# Patient Record
Sex: FEMALE | Race: WHITE | Employment: UNEMPLOYED | RURAL
[De-identification: names, ages, dates, MRNs, and addresses within clinical notes are randomized per-mention and may not be internally consistent; named-entity substitution may affect disease eponyms.]

---

## 2020-09-12 PROBLEM — N39.0 COMPLICATED UTI (URINARY TRACT INFECTION): Status: ACTIVE | Noted: 2020-09-12

## 2020-09-13 PROBLEM — I10 ESSENTIAL HYPERTENSION: Chronic | Status: ACTIVE | Noted: 2020-09-13

## 2020-09-13 PROBLEM — R32 INCONTINENCE OF URINE IN FEMALE: Chronic | Status: ACTIVE | Noted: 2020-09-13

## 2020-09-13 PROBLEM — E03.9 ACQUIRED HYPOTHYROIDISM: Chronic | Status: ACTIVE | Noted: 2020-09-13

## 2020-09-13 PROBLEM — I82.513 CHRONIC DEEP VEIN THROMBOSIS (DVT) OF FEMORAL VEIN OF BOTH LOWER EXTREMITIES (HCC): Chronic | Status: ACTIVE | Noted: 2020-09-13

## 2020-09-13 PROBLEM — N39.0 UTI (URINARY TRACT INFECTION): Status: ACTIVE | Noted: 2020-09-13

## 2020-09-13 PROBLEM — I87.8 VENOUS STASIS OF BOTH LOWER EXTREMITIES: Chronic | Status: ACTIVE | Noted: 2020-09-13

## 2021-09-17 ENCOUNTER — HOSPITAL ENCOUNTER (EMERGENCY)
Age: 86
Discharge: HOME | End: 2021-09-17
Attending: FAMILY MEDICINE
Payer: MEDICARE

## 2021-09-17 DIAGNOSIS — R60.0 PEDAL EDEMA: Primary | ICD-10-CM

## 2021-09-17 LAB
ALBUMIN SERPL-MCNC: 3.8 G/DL (ref 3.5–5)
ALBUMIN/GLOB SERPL: 0.9 {RATIO} (ref 1.1–2.2)
ALP SERPL-CCNC: 129 U/L (ref 45–117)
ALT SERPL-CCNC: 24 U/L (ref 12–78)
ANION GAP SERPL CALC-SCNC: 6 MMOL/L (ref 5–15)
APPEARANCE UR: CLEAR
AST SERPL-CCNC: 16 U/L (ref 15–37)
BACTERIA URNS QL MICRO: NEGATIVE /HPF
BASOPHILS # BLD: 0.1 K/UL (ref 0–0.1)
BASOPHILS NFR BLD: 1 % (ref 0–1)
BILIRUB SERPL-MCNC: 0.4 MG/DL (ref 0.2–1)
BILIRUB UR QL: NEGATIVE
BNP SERPL-MCNC: 248 PG/ML (ref 0–450)
BUN SERPL-MCNC: 23 MG/DL (ref 6–20)
BUN/CREAT SERPL: 16 (ref 12–20)
CALCIUM SERPL-MCNC: 9.5 MG/DL (ref 8.5–10.1)
CHLORIDE SERPL-SCNC: 102 MMOL/L (ref 97–108)
CO2 SERPL-SCNC: 31 MMOL/L (ref 21–32)
COLOR UR: ABNORMAL
CREAT SERPL-MCNC: 1.44 MG/DL (ref 0.55–1.02)
DIFFERENTIAL METHOD BLD: ABNORMAL
EOSINOPHIL # BLD: 0.1 K/UL (ref 0–0.4)
EOSINOPHIL NFR BLD: 1 % (ref 0–7)
EPITH CASTS URNS QL MICRO: NORMAL /LPF
ERYTHROCYTE [DISTWIDTH] IN BLOOD BY AUTOMATED COUNT: 14.2 % (ref 11.5–14.5)
GLOBULIN SER CALC-MCNC: 4.1 G/DL (ref 2–4)
GLUCOSE SERPL-MCNC: 104 MG/DL (ref 65–100)
GLUCOSE UR STRIP.AUTO-MCNC: NEGATIVE MG/DL
HCT VFR BLD AUTO: 42.6 % (ref 35–47)
HGB BLD-MCNC: 14 G/DL (ref 11.5–16)
HGB UR QL STRIP: ABNORMAL
IMM GRANULOCYTES # BLD AUTO: 0 K/UL (ref 0–0.04)
IMM GRANULOCYTES NFR BLD AUTO: 0 % (ref 0–0.5)
KETONES UR QL STRIP.AUTO: NEGATIVE MG/DL
LEUKOCYTE ESTERASE UR QL STRIP.AUTO: NEGATIVE
LYMPHOCYTES # BLD: 1.7 K/UL (ref 0.8–3.5)
LYMPHOCYTES NFR BLD: 24 % (ref 12–49)
MAGNESIUM SERPL-MCNC: 2.1 MG/DL (ref 1.6–2.4)
MCH RBC QN AUTO: 29.2 PG (ref 26–34)
MCHC RBC AUTO-ENTMCNC: 32.9 G/DL (ref 30–36.5)
MCV RBC AUTO: 88.9 FL (ref 80–99)
MONOCYTES # BLD: 0.5 K/UL (ref 0–1)
MONOCYTES NFR BLD: 7 % (ref 5–13)
NEUTS SEG # BLD: 4.7 K/UL (ref 1.8–8)
NEUTS SEG NFR BLD: 67 % (ref 32–75)
NITRITE UR QL STRIP.AUTO: NEGATIVE
NRBC # BLD: 0 K/UL (ref 0–0.01)
NRBC BLD-RTO: 0 PER 100 WBC
PH UR STRIP: 7 [PH] (ref 5–8)
PLATELET # BLD AUTO: 238 K/UL (ref 150–400)
PMV BLD AUTO: 8.2 FL (ref 8.9–12.9)
POTASSIUM SERPL-SCNC: 3.8 MMOL/L (ref 3.5–5.1)
PROT SERPL-MCNC: 7.9 G/DL (ref 6.4–8.2)
PROT UR STRIP-MCNC: NEGATIVE MG/DL
RBC # BLD AUTO: 4.79 M/UL (ref 3.8–5.2)
RBC #/AREA URNS HPF: NORMAL /HPF (ref 0–5)
SODIUM SERPL-SCNC: 139 MMOL/L (ref 136–145)
SP GR UR REFRACTOMETRY: 1.01 (ref 1–1.03)
TROPONIN I SERPL-MCNC: <0.05 NG/ML
TSH SERPL DL<=0.05 MIU/L-ACNC: 1.28 UIU/ML (ref 0.36–3.74)
UROBILINOGEN UR QL STRIP.AUTO: 0.2 EU/DL (ref 0.2–1)
WBC # BLD AUTO: 7.1 K/UL (ref 3.6–11)
WBC URNS QL MICRO: NORMAL /HPF (ref 0–4)

## 2021-09-17 PROCEDURE — 36415 COLL VENOUS BLD VENIPUNCTURE: CPT

## 2021-09-17 PROCEDURE — 93005 ELECTROCARDIOGRAM TRACING: CPT

## 2021-09-17 PROCEDURE — 99284 EMERGENCY DEPT VISIT MOD MDM: CPT

## 2021-09-17 PROCEDURE — 96374 THER/PROPH/DIAG INJ IV PUSH: CPT

## 2021-09-17 PROCEDURE — 81001 URINALYSIS AUTO W/SCOPE: CPT

## 2021-09-17 PROCEDURE — 85025 COMPLETE CBC W/AUTO DIFF WBC: CPT

## 2021-09-17 PROCEDURE — 84443 ASSAY THYROID STIM HORMONE: CPT

## 2021-09-17 PROCEDURE — 83880 ASSAY OF NATRIURETIC PEPTIDE: CPT

## 2021-09-17 PROCEDURE — 83735 ASSAY OF MAGNESIUM: CPT

## 2021-09-17 PROCEDURE — 84484 ASSAY OF TROPONIN QUANT: CPT

## 2021-09-17 PROCEDURE — 80053 COMPREHEN METABOLIC PANEL: CPT

## 2021-09-17 PROCEDURE — 74011250636 HC RX REV CODE- 250/636: Performed by: FAMILY MEDICINE

## 2021-09-17 RX ORDER — SODIUM CHLORIDE 0.9 % (FLUSH) 0.9 %
5-10 SYRINGE (ML) INJECTION ONCE
Status: DISCONTINUED | OUTPATIENT
Start: 2021-09-17 | End: 2021-09-18 | Stop reason: HOSPADM

## 2021-09-17 RX ORDER — FUROSEMIDE 10 MG/ML
20 INJECTION INTRAMUSCULAR; INTRAVENOUS
Status: COMPLETED | OUTPATIENT
Start: 2021-09-17 | End: 2021-09-17

## 2021-09-17 RX ADMIN — FUROSEMIDE 20 MG: 20 INJECTION, SOLUTION INTRAMUSCULAR; INTRAVENOUS at 22:26

## 2021-09-18 VITALS
HEIGHT: 58 IN | TEMPERATURE: 99.7 F | OXYGEN SATURATION: 99 % | HEART RATE: 66 BPM | WEIGHT: 195.55 LBS | SYSTOLIC BLOOD PRESSURE: 187 MMHG | BODY MASS INDEX: 41.05 KG/M2 | RESPIRATION RATE: 14 BRPM | DIASTOLIC BLOOD PRESSURE: 70 MMHG

## 2021-09-18 NOTE — ED PROVIDER NOTES
Patient is a 55-year-old female with a history of arthritis, hypertension and DVT who notes bilateral swelling in her legs after having had a steroid injection into her right knee approximately 2 days ago. .  She has no pain in the legs unless if there is direct pressure applied to them. No chest pain heaviness or pressure. No shortness of breath or cough. No orthopnea or PND. No VELASCO. No hemoptysis. No neck arm or jaw pain. No abdominal pain. No abdominal bloating. She has chronic right knee pain. No history of coronary artery disease or diabetes. Patient may have had symptoms like this in the past and was diuresed and was to wear support stockings but she could not afford them. Nothing makes her symptoms better or worse. Past Medical History:   Diagnosis Date    Arthritis     Ill-defined condition     vertigo    Thromboembolus [facility])        Past Surgical History:   Procedure Laterality Date    HX GYN      hysterectomy         History reviewed. No pertinent family history. Social History     Socioeconomic History    Marital status:      Spouse name: Not on file    Number of children: Not on file    Years of education: Not on file    Highest education level: Not on file   Occupational History    Not on file   Tobacco Use    Smoking status: Never Smoker    Smokeless tobacco: Never Used   Substance and Sexual Activity    Alcohol use: Never    Drug use: Never    Sexual activity: Not on file   Other Topics Concern    Not on file   Social History Narrative    Not on file     Social Determinants of Health     Financial Resource Strain:     Difficulty of Paying Living Expenses:    Food Insecurity:     Worried About Running Out of Food in the Last Year:     [address] in the Last Year:    Transportation Needs:     Lack of Transportation (Medical):      Lack of Transportation (Non-Medical):    Physical Activity:     Days of Exercise per Week:     Minutes of Exercise per Session:    Stress:     Feeling of Stress :    Social Connections:     Frequency of Communication with Friends and Family:     Frequency of Social Gatherings with Friends and Family:     Attends Alevism Services:     Active Member of Clubs or Organizations:     Attends Club or Organization Meetings:     Marital Status:    Intimate Partner Violence:     Fear of Current or Ex-Partner:     Emotionally Abused:     Physically Abused:     Sexually Abused: ALLERGIES: Doxycycline    Review of Systems   All other systems reviewed and are negative. Vitals:    09/17/21 1901   BP: (!) 187/70   Pulse: 66   Resp: 14   Temp: 99.7 °F (37.6 °C)   SpO2: 99%   Weight: 88.7 kg (195 lb 8.8 oz)   Height: 4' 10\" (1.473 m)            Physical Exam  Vitals and nursing note reviewed. Constitutional:       General: She is not in acute distress. Appearance: She is obese. She is not ill-appearing or toxic-appearing. HENT:      Head: Normocephalic and atraumatic. Right Ear: External ear normal.      Left Ear: External ear normal.      Nose: Nose normal.      Mouth/Throat:      Mouth: Mucous membranes are moist.      Pharynx: No oropharyngeal exudate or posterior oropharyngeal erythema. Eyes:      Extraocular Movements: Extraocular movements intact. Conjunctiva/sclera: Conjunctivae normal.      Pupils: Pupils are equal, round, and reactive to light. Cardiovascular:      Rate and Rhythm: Normal rate and regular rhythm. Heart sounds: Normal heart sounds. No murmur heard. No friction rub. No gallop. Comments: No JVD noted  Pulmonary:      Effort: Pulmonary effort is normal. No respiratory distress. Breath sounds: Normal breath sounds. No stridor. No wheezing or rales. Chest:      Chest wall: No tenderness. Abdominal:      General: There is no distension. Palpations: Abdomen is soft. Tenderness: There is no abdominal tenderness. There is no guarding or rebound. Musculoskeletal:         General: Swelling and tenderness present. No deformity or signs of injury. Normal range of motion. Cervical back: Normal range of motion and neck supple. No muscular tenderness. Right lower leg: Edema present. Left lower leg: Edema present. Comments: Bilateral lower extremity edema. Mild erythema anteriorly over the shins which patient states is chronic and unchanged. No cords or calf tenderness is noted. Legs are uncomfortable with direct pressure to check for pitting edema which is 1-2+. 1+ DP pulses bilaterally, sensation intact throughout. No knee effusion is noted. Upper extremities without abnormality. Lymphadenopathy:      Cervical: No cervical adenopathy. Skin:     General: Skin is warm and dry. Capillary Refill: Capillary refill takes less than 2 seconds. Coloration: Skin is not jaundiced or pale. Findings: Erythema present. No rash. Neurological:      General: No focal deficit present. Mental Status: She is alert and oriented to person, place, and time. Cranial Nerves: No cranial nerve deficit. Sensory: No sensory deficit. Motor: No weakness. Coordination: Coordination normal.      Gait: Gait normal.   Psychiatric:         Mood and Affect: Mood normal.         Behavior: Behavior normal.         Thought Content:  Thought content normal.         Judgment: Judgment normal.        Labs -  Recent Results (from the past 24 hour(s))   URINALYSIS W/ RFLX MICROSCOPIC    Collection Time: 09/17/21  7:55 PM   Result Value Ref Range    Color YELLOW/STRAW      Appearance CLEAR CLEAR      Specific gravity 1.007 1.003 - 1.030      pH (UA) 7.0 5.0 - 8.0      Protein Negative NEG mg/dL    Glucose Negative NEG mg/dL    Ketone Negative NEG mg/dL    Bilirubin Negative NEG      Blood TRACE (A) NEG      Urobilinogen 0.2 0.2 - 1.0 EU/dL    Nitrites Negative NEG      Leukocyte Esterase Negative NEG     URINE MICROSCOPIC ONLY    Collection Time: 09/17/21  7:55 PM   Result Value Ref Range    WBC 0-4 0 - 4 /hpf    RBC 5-10 0 - 5 /hpf    Epithelial cells FEW FEW /lpf    Bacteria Negative NEG /hpf   CBC WITH AUTOMATED DIFF    Collection Time: 09/17/21  8:30 PM   Result Value Ref Range    WBC 7.1 3.6 - 11.0 K/uL    RBC 4.79 3.80 - 5.20 M/uL    HGB 14.0 11.5 - 16.0 g/dL    HCT 42.6 35.0 - 47.0 %    MCV 88.9 80.0 - 99.0 FL    MCH 29.2 26.0 - 34.0 PG    MCHC 32.9 30.0 - 36.5 g/dL    RDW 14.2 11.5 - 14.5 %    PLATELET 488 012 - 288 K/uL    MPV 8.2 (L) 8.9 - 12.9 FL    NRBC 0.0 0  WBC    ABSOLUTE NRBC 0.00 0.00 - 0.01 K/uL    NEUTROPHILS 67 32 - 75 %    LYMPHOCYTES 24 12 - 49 %    MONOCYTES 7 5 - 13 %    EOSINOPHILS 1 0 - 7 %    BASOPHILS 1 0 - 1 %    IMMATURE GRANULOCYTES 0 0.0 - 0.5 %    ABS. NEUTROPHILS 4.7 1.8 - 8.0 K/UL    ABS. LYMPHOCYTES 1.7 0.8 - 3.5 K/UL    ABS. MONOCYTES 0.5 0.0 - 1.0 K/UL    ABS. EOSINOPHILS 0.1 0.0 - 0.4 K/UL    ABS. BASOPHILS 0.1 0.0 - 0.1 K/UL    ABS. IMM. GRANS. 0.0 0.00 - 0.04 K/UL    DF AUTOMATED     METABOLIC PANEL, COMPREHENSIVE    Collection Time: 09/17/21  8:30 PM   Result Value Ref Range    Sodium 139 136 - 145 mmol/L    Potassium 3.8 3.5 - 5.1 mmol/L    Chloride 102 97 - 108 mmol/L    CO2 31 21 - 32 mmol/L    Anion gap 6 5 - 15 mmol/L    Glucose 104 (H) 65 - 100 mg/dL    BUN 23 (H) 6 - 20 MG/DL    Creatinine 1.44 (H) 0.55 - 1.02 MG/DL    BUN/Creatinine ratio 16 12 - 20      GFR est AA 42 (L) >60 ml/min/1.73m2    GFR est non-AA 35 (L) >60 ml/min/1.73m2    Calcium 9.5 8.5 - 10.1 MG/DL    Bilirubin, total 0.4 0.2 - 1.0 MG/DL    ALT (SGPT) 24 12 - 78 U/L    AST (SGOT) 16 15 - 37 U/L    Alk.  phosphatase 129 (H) 45 - 117 U/L    Protein, total 7.9 6.4 - 8.2 g/dL    Albumin 3.8 3.5 - 5.0 g/dL    Globulin 4.1 (H) 2.0 - 4.0 g/dL    A-G Ratio 0.9 (L) 1.1 - 2.2     MAGNESIUM    Collection Time: 09/17/21  8:30 PM   Result Value Ref Range    Magnesium 2.1 1.6 - 2.4 mg/dL   TROPONIN I    Collection Time: 09/17/21  8:30 PM Result Value Ref Range    Troponin-I, Qt. <0.05 <0.05 ng/mL   TSH 3RD GENERATION    Collection Time: 09/17/21  8:30 PM   Result Value Ref Range    TSH 1.28 0.36 - 3.74 uIU/mL   NT-PRO BNP    Collection Time: 09/17/21  8:30 PM   Result Value Ref Range    NT pro- 0 - 450 PG/ML   EKG, 12 LEAD, INITIAL    Collection Time: 09/17/21  8:45 PM   Result Value Ref Range    Ventricular Rate 65 BPM    Atrial Rate 65 BPM    P-R Interval 224 ms    QRS Duration 150 ms    Q-T Interval 436 ms    QTC Calculation (Bezet) 453 ms    Calculated P Axis 8 degrees    Calculated R Axis -70 degrees    Calculated T Axis 62 degrees    Diagnosis       Sinus rhythm with 1st degree AV block  Left axis deviation  Left bundle branch block  Abnormal ECG  When compared with ECG of 13-SEP-2020 11:42,  No significant change was found       Radiologic Studies -   CT Results  (Last 48 hours)    None          XR Results (most recent):  Results from Hospital Encounter encounter on 09/12/20    XR CHEST PA LAT    Narrative  EXAM: XR CHEST PA LAT    INDICATION: Fever /  Fleet [place] /  HTN   /  h/o DVT    COMPARISON: 10/15/2018. FINDINGS: PA and lateral radiographs of the chest demonstrate clear lungs and  pleural margins. Cardiac size is again shown to be upper limits of normal. Mild  thoracic aortic tortuosity is again noted with otherwise normal mediastinal and  hilar contour. No substantial osseous abnormality is demonstrated. Impression  IMPRESSION: No acute findings.         MDM  Number of Diagnoses or Management Options  Pedal edema: established and worsening     Amount and/or Complexity of Data Reviewed  Clinical lab tests: ordered and reviewed  Tests in the medicine section of CPT®: reviewed and ordered  Decide to obtain previous medical records or to obtain history from someone other than the patient: yes    Risk of Complications, Morbidity, and/or Mortality  Presenting problems: moderate  Diagnostic procedures: moderate  Management options: moderate  General comments: Patient clinically does not have appear to have a DVT as she has bilateral lower extremity edema which is more or less symmetric. There is no calf tenderness redness or swelling consistent with DVT. Patient is taking Eliquis, which makes it less likely. She has had a recent steroid injections into the right knee which could be causing her to retain fluid and subsequently have pedal edema. No symptoms of congestive heart failure listed. Patient is on no medications that are new which may be causing pedal edema. She has had similar symptoms in the past requiring diuresis and support stockings. Patient Progress  Patient progress: stable    ED Course as of Sep 18 0414   Sat Sep 18, 2021   0410 CBC and CMP are unremarkable except for slight worsening of her chronic renal insufficiency with a creatinine of 1.44. Troponin is negative and EKG is without significant dysrhythmia. BMP is unremarkable magnesium is normal. TSH is normal. Patient was given Lasix here in the ED. I feel that she would benefit from a couple days of increased Lasix to diurese her. I feel that her pedal edema is possibly secondary to recent steroid injection. Patient understands that she needs to follow-up with her primary care doctor within next 2 to 3 days and recheck her renal function studies, she should weigh herself daily and bring daily weights of the visit. Patient understands to return if worse or for any change in symptoms. [PS]   [id number] EKG reveals sinus rhythm with first-degree AV block, ventricular rate of 65, MN interval prolonged at 224, QRS duration is prolonged at 150, QTc is 453, left axis deviation is noted, left bundle branch block is noted. EKG is similar to EKG performed on 12 September 2020 except that first-degree AV block is now more pronounced. Left bundle branch block is more obvious but it was I believe that was present on 12 September 2020 also. It was also present on an EKG prior to that. No evidence of acute ischemia or infarction. [PS]      ED Course User Index  [PS] Tawana Méndez MD       Procedures  Medications   furosemide (LASIX) injection 20 mg (20 mg IntraVENous Given 9/17/21 2226)       No data found. Discharge note:  Pt re-evaluated and noted to be stable, ready for discharge. Updated pt  on all final lab and  X-ray  findings. Will follow up as instructed . All questions have been answered, pt voiced understanding and agreement with plan. Specific return precautions provided as well as instructions to return to the ED should sx worsen at any time. Vital signs stable for discharge. Diagnosis     Clinical Impression:     ICD-10-CM ICD-9-CM    1. Pedal edema  R60.0 782.3          PLAN:  1. Discharge Medication List as of 9/17/2021 11:07 PM        2. Follow-up Information     Follow up With Specialties Details Why Contact Info    Sagrario Montes NP Nurse Practitioner  Follow up todays symptoms. [address]  [phone]          Return to ED if worse     Disposition:  Discharged  Home     Please note, this dictation was completed with [employer], the [employer] voice recognition software. Quite often unanticipated grammatical, syntax, homophones, and other interpretive errors are inadvertently transcribed by the computer software. Please disregard these errors. Please excuse any errors that have escaped final proof reading.

## 2021-09-25 LAB
ATRIAL RATE: 65 BPM
CALCULATED P AXIS, ECG09: 8 DEGREES
CALCULATED R AXIS, ECG10: -70 DEGREES
CALCULATED T AXIS, ECG11: 62 DEGREES
DIAGNOSIS, 93000: NORMAL
P-R INTERVAL, ECG05: 224 MS
Q-T INTERVAL, ECG07: 436 MS
QRS DURATION, ECG06: 150 MS
QTC CALCULATION (BEZET), ECG08: 453 MS
VENTRICULAR RATE, ECG03: 65 BPM

## 2021-10-29 ENCOUNTER — APPOINTMENT (OUTPATIENT)
Dept: GENERAL RADIOLOGY | Age: 86
DRG: 872 | End: 2021-10-29
Attending: EMERGENCY MEDICINE
Payer: MEDICARE

## 2021-10-29 ENCOUNTER — HOSPITAL ENCOUNTER (INPATIENT)
Age: 86
LOS: 1 days | Discharge: HOME | DRG: 872 | End: 2021-11-01
Attending: EMERGENCY MEDICINE | Admitting: INTERNAL MEDICINE
Payer: MEDICARE

## 2021-10-29 DIAGNOSIS — A41.9 SEPSIS WITHOUT ACUTE ORGAN DYSFUNCTION, DUE TO UNSPECIFIED ORGANISM (HCC): Primary | ICD-10-CM

## 2021-10-29 LAB
ALBUMIN SERPL-MCNC: 3.7 G/DL (ref 3.5–5)
ALBUMIN/GLOB SERPL: 1 {RATIO} (ref 1.1–2.2)
ALP SERPL-CCNC: 126 U/L (ref 45–117)
ALT SERPL-CCNC: 25 U/L (ref 12–78)
ANION GAP SERPL CALC-SCNC: 14 MMOL/L (ref 5–15)
APPEARANCE UR: CLEAR
AST SERPL-CCNC: 16 U/L (ref 15–37)
BACTERIA URNS QL MICRO: ABNORMAL /HPF
BASOPHILS # BLD: 0.1 K/UL (ref 0–0.1)
BASOPHILS NFR BLD: 1 % (ref 0–1)
BILIRUB SERPL-MCNC: 0.3 MG/DL (ref 0.2–1)
BILIRUB UR QL: NEGATIVE
BUN SERPL-MCNC: 16 MG/DL (ref 6–20)
BUN/CREAT SERPL: 9 (ref 12–20)
CALCIUM SERPL-MCNC: 9.2 MG/DL (ref 8.5–10.1)
CHLORIDE SERPL-SCNC: 100 MMOL/L (ref 97–108)
CO2 SERPL-SCNC: 25 MMOL/L (ref 21–32)
COLOR UR: ABNORMAL
CREAT SERPL-MCNC: 1.72 MG/DL (ref 0.55–1.02)
DIFFERENTIAL METHOD BLD: ABNORMAL
EOSINOPHIL # BLD: 0 K/UL (ref 0–0.4)
EOSINOPHIL NFR BLD: 0 % (ref 0–7)
EPITH CASTS URNS QL MICRO: ABNORMAL /LPF
ERYTHROCYTE [DISTWIDTH] IN BLOOD BY AUTOMATED COUNT: 14.5 % (ref 11.5–14.5)
GLOBULIN SER CALC-MCNC: 3.8 G/DL (ref 2–4)
GLUCOSE SERPL-MCNC: 148 MG/DL (ref 65–100)
GLUCOSE UR STRIP.AUTO-MCNC: NEGATIVE MG/DL
HCT VFR BLD AUTO: 42.5 % (ref 35–47)
HGB BLD-MCNC: 13.9 G/DL (ref 11.5–16)
HGB UR QL STRIP: ABNORMAL
IMM GRANULOCYTES # BLD AUTO: 0 K/UL (ref 0–0.04)
IMM GRANULOCYTES NFR BLD AUTO: 0 % (ref 0–0.5)
KETONES UR QL STRIP.AUTO: NEGATIVE MG/DL
LACTATE SERPL-SCNC: 1.5 MMOL/L (ref 0.4–2)
LACTATE SERPL-SCNC: 2.9 MMOL/L (ref 0.4–2)
LEUKOCYTE ESTERASE UR QL STRIP.AUTO: ABNORMAL
LYMPHOCYTES # BLD: 1 K/UL (ref 0.8–3.5)
LYMPHOCYTES NFR BLD: 10 % (ref 12–49)
MCH RBC QN AUTO: 29.3 PG (ref 26–34)
MCHC RBC AUTO-ENTMCNC: 32.7 G/DL (ref 30–36.5)
MCV RBC AUTO: 89.7 FL (ref 80–99)
MONOCYTES # BLD: 0.6 K/UL (ref 0–1)
MONOCYTES NFR BLD: 6 % (ref 5–13)
MUCOUS THREADS URNS QL MICRO: ABNORMAL /LPF
NEUTS SEG # BLD: 9.1 K/UL (ref 1.8–8)
NEUTS SEG NFR BLD: 83 % (ref 32–75)
NITRITE UR QL STRIP.AUTO: NEGATIVE
NRBC # BLD: 0 K/UL (ref 0–0.01)
NRBC BLD-RTO: 0 PER 100 WBC
PH UR STRIP: 7 [PH] (ref 5–8)
PLATELET # BLD AUTO: 242 K/UL (ref 150–400)
PMV BLD AUTO: 8.4 FL (ref 8.9–12.9)
POTASSIUM SERPL-SCNC: 4.5 MMOL/L (ref 3.5–5.1)
PROT SERPL-MCNC: 7.5 G/DL (ref 6.4–8.2)
PROT UR STRIP-MCNC: ABNORMAL MG/DL
RBC # BLD AUTO: 4.74 M/UL (ref 3.8–5.2)
RBC #/AREA URNS HPF: ABNORMAL /HPF (ref 0–5)
SODIUM SERPL-SCNC: 139 MMOL/L (ref 136–145)
SP GR UR REFRACTOMETRY: 1.01 (ref 1–1.03)
TROPONIN-HIGH SENSITIVITY: 9 NG/L (ref 0–51)
UA: UC IF INDICATED,UAUC: ABNORMAL
UROBILINOGEN UR QL STRIP.AUTO: 0.2 EU/DL (ref 0.2–1)
WBC # BLD AUTO: 11 K/UL (ref 3.6–11)
WBC URNS QL MICRO: >100 /HPF (ref 0–4)

## 2021-10-29 PROCEDURE — 74011250636 HC RX REV CODE- 250/636: Performed by: EMERGENCY MEDICINE

## 2021-10-29 PROCEDURE — 36415 COLL VENOUS BLD VENIPUNCTURE: CPT

## 2021-10-29 PROCEDURE — 74011250637 HC RX REV CODE- 250/637: Performed by: FAMILY MEDICINE

## 2021-10-29 PROCEDURE — 99285 EMERGENCY DEPT VISIT HI MDM: CPT

## 2021-10-29 PROCEDURE — 84484 ASSAY OF TROPONIN QUANT: CPT

## 2021-10-29 PROCEDURE — 80053 COMPREHEN METABOLIC PANEL: CPT

## 2021-10-29 PROCEDURE — 87086 URINE CULTURE/COLONY COUNT: CPT

## 2021-10-29 PROCEDURE — 81001 URINALYSIS AUTO W/SCOPE: CPT

## 2021-10-29 PROCEDURE — 93005 ELECTROCARDIOGRAM TRACING: CPT

## 2021-10-29 PROCEDURE — 71045 X-RAY EXAM CHEST 1 VIEW: CPT

## 2021-10-29 PROCEDURE — 74011250636 HC RX REV CODE- 250/636: Performed by: FAMILY MEDICINE

## 2021-10-29 PROCEDURE — 74011000258 HC RX REV CODE- 258: Performed by: FAMILY MEDICINE

## 2021-10-29 PROCEDURE — 99218 HC RM OBSERVATION: CPT

## 2021-10-29 PROCEDURE — 87040 BLOOD CULTURE FOR BACTERIA: CPT

## 2021-10-29 PROCEDURE — 96365 THER/PROPH/DIAG IV INF INIT: CPT

## 2021-10-29 PROCEDURE — 85025 COMPLETE CBC W/AUTO DIFF WBC: CPT

## 2021-10-29 PROCEDURE — 83605 ASSAY OF LACTIC ACID: CPT

## 2021-10-29 RX ORDER — GLUCOSAMINE/CHONDR SU A SOD 750-600 MG
2500 TABLET ORAL DAILY
Status: ON HOLD | COMMUNITY
End: 2021-11-01 | Stop reason: SDUPTHER

## 2021-10-29 RX ORDER — SODIUM CHLORIDE 9 MG/ML
100 INJECTION, SOLUTION INTRAVENOUS ONCE
Status: COMPLETED | OUTPATIENT
Start: 2021-10-29 | End: 2021-10-29

## 2021-10-29 RX ORDER — SODIUM CHLORIDE 0.9 % (FLUSH) 0.9 %
5-10 SYRINGE (ML) INJECTION AS NEEDED
Status: DISCONTINUED | OUTPATIENT
Start: 2021-10-29 | End: 2021-11-01 | Stop reason: HOSPADM

## 2021-10-29 RX ORDER — DILTIAZEM HYDROCHLORIDE 240 MG/1
240 CAPSULE, COATED, EXTENDED RELEASE ORAL
Status: COMPLETED | OUTPATIENT
Start: 2021-10-29 | End: 2021-10-29

## 2021-10-29 RX ORDER — ACETAMINOPHEN 500 MG
1000 TABLET ORAL
Status: DISCONTINUED | OUTPATIENT
Start: 2021-10-29 | End: 2021-10-29

## 2021-10-29 RX ORDER — ACETAMINOPHEN 325 MG/1
650 TABLET ORAL
Status: DISCONTINUED | OUTPATIENT
Start: 2021-10-29 | End: 2021-11-01 | Stop reason: HOSPADM

## 2021-10-29 RX ORDER — ONDANSETRON 2 MG/ML
4 INJECTION INTRAMUSCULAR; INTRAVENOUS
Status: DISCONTINUED | OUTPATIENT
Start: 2021-10-29 | End: 2021-11-01 | Stop reason: HOSPADM

## 2021-10-29 RX ADMIN — CEFTRIAXONE SODIUM 2 G: 2 INJECTION, POWDER, FOR SOLUTION INTRAMUSCULAR; INTRAVENOUS at 20:13

## 2021-10-29 RX ADMIN — APIXABAN 5 MG: 2.5 TABLET, FILM COATED ORAL at 20:54

## 2021-10-29 RX ADMIN — SODIUM CHLORIDE 100 ML/HR: 9 INJECTION, SOLUTION INTRAVENOUS at 21:00

## 2021-10-29 RX ADMIN — SODIUM CHLORIDE 500 ML: 9 INJECTION, SOLUTION INTRAVENOUS at 20:31

## 2021-10-29 RX ADMIN — DILTIAZEM HYDROCHLORIDE 240 MG: 240 CAPSULE, COATED, EXTENDED RELEASE ORAL at 20:54

## 2021-10-29 RX ADMIN — ACETAMINOPHEN 650 MG: 325 TABLET ORAL at 22:48

## 2021-10-29 RX ADMIN — SODIUM CHLORIDE 500 ML: 9 INJECTION, SOLUTION INTRAVENOUS at 19:06

## 2021-10-29 NOTE — ED NOTES
Urine obtained with assistance. Pt moved back to bed. Placed on purewick as requested with clean brief. Pt had much difficulty moving up in bed.

## 2021-10-29 NOTE — ED PROVIDER NOTES
EMERGENCY DEPARTMENT HISTORY AND PHYSICAL EXAM      Date: 10/29/2021  Patient Name: Jorge King    History of Presenting Illness     Chief Complaint   Patient presents with    Bladder Infection       History Provided By: Patient    HPI: Jorge King, 81 y.o. female with PMHx significant for hypertension, hypothyroid, presents via EMS to the ED with cc of UTI symptoms. Patient reports she was recently diagnosed by her urologist with a UTI. She saw them and was started yesterday on Bactrim. She has had 3 doses so far. She presents with generalized weakness, nausea and urinary frequency and burning. She denies any abdominal pain. She denies any vomiting. No diarrhea. Denies any chest pain or shortness of breath. PMHx: Significant for tension, hypothyroid  PSHx: Significant for hysterectomy  Social Hx: Non-smoker. There are no other complaints, changes, or physical findings at this time. PCP: Jose Luis Griggs NP    No current facility-administered medications on file prior to encounter. Current Outpatient Medications on File Prior to Encounter   Medication Sig Dispense Refill    Biotin 2,500 mcg cap Take 2,500 mg by mouth daily.  mirabegron ER (Myrbetriq) 25 mg ER tablet Take 25 mg by mouth daily.  apixaban (Eliquis) 5 mg tablet Take 5 mg by mouth two (2) times a day. Indications: blood clot in a deep vein of the extremities      vit a,c & e-lutein-minerals (OCUVITE) tablet 1 Tab daily.  dilTIAZem ER (CARDIZEM LA) 240 mg Tb24 tablet Take 240 mg by mouth daily.  furosemide (LASIX) 20 mg tablet Take 20 mg by mouth daily.  hydroCHLOROthiazide (HYDRODIURIL) 12.5 mg tablet Take 12.5 mg by mouth daily.  levothyroxine (SYNTHROID) 100 mcg tablet Take 100 mcg by mouth Daily (before breakfast).  meclizine (ANTIVERT) 12.5 mg tablet Take 12.5 mg by mouth three (3) times daily as needed.          Past History     Past Medical History:  Past Medical History: Diagnosis Date    Arthritis     Ill-defined condition     vertigo    Thromboembolus [facility])        Past Surgical History:  Past Surgical History:   Procedure Laterality Date    HX GYN      hysterectomy       Family History:  History reviewed. No pertinent family history. Social History:  Social History     Tobacco Use    Smoking status: Never Smoker    Smokeless tobacco: Never Used   Substance Use Topics    Alcohol use: Never    Drug use: Never       Allergies: Allergies   Allergen Reactions    Doxycycline Nausea and Vomiting         Review of Systems   Review of Systems   Constitutional: Positive for fatigue. Negative for activity change, chills and fever. HENT: Negative for congestion and sore throat. Eyes: Negative for pain and redness. Respiratory: Negative for cough, chest tightness and shortness of breath. Cardiovascular: Negative for chest pain and palpitations. Gastrointestinal: Positive for nausea. Negative for abdominal pain, diarrhea and vomiting. Genitourinary: Negative for dysuria, frequency and urgency. Musculoskeletal: Negative for back pain and neck pain. Skin: Negative for rash. Neurological: Negative for syncope, light-headedness and headaches. Psychiatric/Behavioral: Negative for confusion. All other systems reviewed and are negative. Physical Exam   Physical Exam  Vitals and nursing note reviewed. Constitutional:       General: She is not in acute distress. Appearance: She is well-developed. She is obese. She is not diaphoretic. Comments: Calm and pleasant elderly white female. HENT:      Head: Normocephalic. Nose: Nose normal.      Mouth/Throat:      Pharynx: No oropharyngeal exudate. Eyes:      General: No scleral icterus. Conjunctiva/sclera: Conjunctivae normal.      Pupils: Pupils are equal, round, and reactive to light. Neck:      Thyroid: No thyromegaly. Vascular: No JVD. Trachea: No tracheal deviation. Cardiovascular:      Rate and Rhythm: Normal rate and regular rhythm. Heart sounds: No murmur heard. No friction rub. No gallop. Pulmonary:      Effort: Pulmonary effort is normal. No respiratory distress. Breath sounds: Normal breath sounds. No stridor. No wheezing or rales. Abdominal:      General: Bowel sounds are normal. There is no distension. Palpations: Abdomen is soft. Tenderness: There is no abdominal tenderness. There is no guarding or rebound. Musculoskeletal:         General: Normal range of motion. Cervical back: Normal range of motion and neck supple. Right lower leg: Edema present. Left lower leg: Edema present. Comments: Plus edema up to the knee bilaterally with chronic stasis changes. Lymphadenopathy:      Cervical: No cervical adenopathy. Skin:     General: Skin is warm and dry. Findings: No erythema or rash. Neurological:      Mental Status: She is alert and oriented to person, place, and time. Cranial Nerves: No cranial nerve deficit. Motor: No abnormal muscle tone.       Coordination: Coordination normal.   Psychiatric:         Behavior: Behavior normal.             Diagnostic Study Results     Labs -     Recent Results (from the past 12 hour(s))   EKG, 12 LEAD, INITIAL    Collection Time: 10/29/21  7:28 PM   Result Value Ref Range    Ventricular Rate 80 BPM    Atrial Rate 80 BPM    P-R Interval 198 ms    QRS Duration 140 ms    Q-T Interval 410 ms    QTC Calculation (Bezet) 472 ms    Calculated P Axis 79 degrees    Calculated R Axis -74 degrees    Calculated T Axis 57 degrees    Diagnosis       Normal sinus rhythm  Left axis deviation  Nonspecific intraventricular block  Abnormal ECG  When compared with ECG of 17-SEP-2021 20:45,  No significant change was found     LACTIC ACID    Collection Time: 10/29/21  9:10 PM   Result Value Ref Range    Lactic acid 1.5 0.4 - 2.0 MMOL/L       Radiologic Studies -   XR CHEST PORT   Final Result   No acute infiltrate. CT Results  (Last 48 hours)    None        CXR Results  (Last 48 hours)               10/29/21 1859  XR CHEST PORT Final result    Impression:  No acute infiltrate. Narrative:  Clinical indication: Shortness of breath. Frontal view of the chest obtained AP portable. Comparison September 13, 2020. No acute infiltrate. The heart size is normal.                       Medical Decision Making   I am the first provider for this patient. I reviewed the vital signs, available nursing notes, past medical history, past surgical history, family history and social history. Vital Signs-Reviewed the patient's vital signs. Patient Vitals for the past 12 hrs:   Temp Pulse Resp BP SpO2   10/30/21 0036 98.5 °F (36.9 °C)       10/29/21 2234 (!) 102 °F (38.9 °C) 75 20 120/65 96 %   10/29/21 2145  82 18 127/86 100 %   10/29/21 2121  72 18 (!) 134/94 96 %   10/29/21 2054  74 18 126/82 96 %   10/29/21 2028 99.2 °F (37.3 °C)       10/29/21 2000  77 18 135/82 97 %           Records Reviewed: Nursing notes reviewed    Provider Notes (Medical Decision Making):   DDX: UTI, sepsis, metabolic abnormality. ED Course:   Initial assessment performed. The patients presenting problems have been discussed, and they are in agreement with the care plan formulated and outlined with them. I have encouraged them to ask questions as they arise throughout their visit. ED Course as of Oct 30 0715   Fri Oct 29, 2021   1913 Labs pending. Care signed out to Dr. Nuria Minaya. Patient well-appearing. Vital signs currently stable. [CH]   2117 Patient be admitted to hospital for IV fluids and IV antibiotics. She is weak and cannot help her self at home. Has a high risk for falling. She has failed outpatient treatment with oral antibiotics.  EKG demonstrates normal sinus rhythm with a rate of 80, MT intervals 198 normal, QRS prolonged at 140, QTc is 472, nonspecific interventricular block is noted. Care discussed with Dr. Chasity Hernandez, patient will be admitted to hospitalist service. [PS]   2200 Repeat lactic acid down to 1.5. Patient continues to be hemodynamically stable while here in the ED. [PS]      ED Course User Index  [CH] Natalia Chen MD  [PS] Vasiliy Lo MD                     Critical Care Time:   0    Disposition:  Admit    PLAN:  1. Current Discharge Medication List        2. Follow-up Information    None       Return to ED if worse     Diagnosis     Clinical Impression:   1. Sepsis without acute organ dysfunction, due to unspecified organism [facility])              Please note that this dictation was completed with [employer], the computer voice recognition software. Quite often unanticipated grammatical, syntax, homophones, and other interpretive errors are inadvertently transcribed by the computer software. Please disregard these errors. Please excuse any errors that have escaped final proofreading.

## 2021-10-29 NOTE — ED TRIAGE NOTES
Pt diagnosed with UTI 10/28/21. Pt has been taking bactrim for her infection. Pt feels weaker, continued fever. Pt brought by EMS, moved from stretcher, unable to ambulate.

## 2021-10-29 NOTE — Clinical Note
Patient Class[de-identified] OBSERVATION [730]   Type of Bed: Medical [8]   Cardiac Monitoring Required?: No   Reason for Observation: if meds   Admitting Diagnosis: Sepsis [facility]) [[id number]]   Admitting Physician: Liz Vega [[id number]]   Attending Physician: Liz Vega [[id number]]

## 2021-10-30 PROCEDURE — 74011250637 HC RX REV CODE- 250/637: Performed by: INTERNAL MEDICINE

## 2021-10-30 PROCEDURE — 96376 TX/PRO/DX INJ SAME DRUG ADON: CPT

## 2021-10-30 PROCEDURE — 99218 HC RM OBSERVATION: CPT

## 2021-10-30 PROCEDURE — 74011250637 HC RX REV CODE- 250/637: Performed by: FAMILY MEDICINE

## 2021-10-30 PROCEDURE — 74011000258 HC RX REV CODE- 258: Performed by: INTERNAL MEDICINE

## 2021-10-30 PROCEDURE — 74011250636 HC RX REV CODE- 250/636: Performed by: INTERNAL MEDICINE

## 2021-10-30 RX ORDER — DILTIAZEM HYDROCHLORIDE 240 MG/1
240 CAPSULE, COATED, EXTENDED RELEASE ORAL DAILY
Status: DISCONTINUED | OUTPATIENT
Start: 2021-10-30 | End: 2021-11-01 | Stop reason: HOSPADM

## 2021-10-30 RX ORDER — TROSPIUM CHLORIDE 20 MG/1
20 TABLET, FILM COATED ORAL DAILY
Status: DISCONTINUED | OUTPATIENT
Start: 2021-10-30 | End: 2021-11-01 | Stop reason: HOSPADM

## 2021-10-30 RX ORDER — MECLIZINE HCL 12.5 MG 12.5 MG/1
12.5 TABLET ORAL
Status: DISCONTINUED | OUTPATIENT
Start: 2021-10-30 | End: 2021-11-01 | Stop reason: HOSPADM

## 2021-10-30 RX ORDER — LEVOTHYROXINE SODIUM 100 UG/1
100 TABLET ORAL
Status: DISCONTINUED | OUTPATIENT
Start: 2021-10-30 | End: 2021-11-01 | Stop reason: HOSPADM

## 2021-10-30 RX ADMIN — TROSPIUM CHLORIDE 20 MG: 20 TABLET, FILM COATED ORAL at 10:37

## 2021-10-30 RX ADMIN — LEVOTHYROXINE SODIUM 100 MCG: 0.1 TABLET ORAL at 10:38

## 2021-10-30 RX ADMIN — CEFTRIAXONE SODIUM 1 G: 1 INJECTION, POWDER, FOR SOLUTION INTRAMUSCULAR; INTRAVENOUS at 20:37

## 2021-10-30 RX ADMIN — APIXABAN 5 MG: 5 TABLET, FILM COATED ORAL at 10:38

## 2021-10-30 RX ADMIN — APIXABAN 5 MG: 5 TABLET, FILM COATED ORAL at 17:20

## 2021-10-30 RX ADMIN — ACETAMINOPHEN 650 MG: 325 TABLET ORAL at 17:23

## 2021-10-30 RX ADMIN — DILTIAZEM HYDROCHLORIDE 240 MG: 240 CAPSULE, COATED, EXTENDED RELEASE ORAL at 10:37

## 2021-10-30 NOTE — PROGRESS NOTES
Problem: Falls - Risk of  Goal: *Absence of Falls  Description: Document Robb Lema Fall Risk and appropriate interventions in the flowsheet.   Outcome: Progressing Towards Goal  Note: Fall Risk Interventions:  Mobility Interventions: PT Consult for mobility concerns         Medication Interventions: Patient to call before getting OOB    Elimination Interventions: Call light in reach    History of Falls Interventions: Bed/chair exit alarm, Door open when patient unattended         Problem: Patient Education: Go to Patient Education Activity  Goal: Patient/Family Education  Outcome: Progressing Towards Goal     Problem: Urinary Tract Infection - Adult  Goal: *Absence of infection signs and symptoms  Outcome: Progressing Towards Goal     Problem: General Medical Care Plan  Goal: *Labs within defined limits  Outcome: Progressing Towards Goal  Goal: *Absence of infection signs and symptoms  Outcome: Progressing Towards Goal  Goal: *Skin integrity maintained  Outcome: Progressing Towards Goal  Goal: *Fluid volume balance  Outcome: Progressing Towards Goal

## 2021-10-30 NOTE — PROGRESS NOTES
TRANSFER - IN REPORT:    Verbal report received from TROY Lutz RN(name) on Varun Davila  being received from ED(unit) for routine progression of care      Report consisted of patients Situation, Background, Assessment and   Recommendations(SBAR). Information from the following report(s) Kardex was reviewed with the receiving nurse. Opportunity for questions and clarification was provided. Assessment completed upon patients arrival to unit and care assumed.

## 2021-10-30 NOTE — PROGRESS NOTES
Bedside shift change report given to [address] (oncoming nurse) by Ailyn Reyes RN (offgoing nurse). Report included the following information Kardex and Recent Results.

## 2021-10-30 NOTE — PROGRESS NOTES
Bedside shift change report given to Karl Carpenter RN (oncoming nurse) by LAUREN Romero LPN (offgoing nurse). Report included the following information SBAR, Kardex and MAR.

## 2021-10-30 NOTE — H&P
History and Physical           History and Physical    Patient: Bharti Duncan MRN: [id number]  SSN: [id number]    YOB: 1935  Age: 81 y.o. Sex: female      Subjective:      Bharti Duncan is a 81 y.o. female with PMHx significant for hypertension, hypothyroid, blood clots, recurrent UTIs presents via EMS to the ED with cc of UTI symptoms and weakness. Patient reports she was recently diagnosed by her urologist with a UTI and appears to have been started on Bactrim which she reports made her feel terrible. She has had 3 doses so far. She presents with generalized weakness, nausea and urinary frequency and burning. She denies any abdominal pain. She denies any vomiting. No diarrhea. Denies any chest pain or shortness of breath. NOTE: She has redness, warmth, and tenderness over both lower legs but she says they have been like that \" for ages\" and to ignore it.     PMHx: Significant for tension, hypothyroid  PSHx: Significant for hysterectomy  Social Hx: Non-smoker. In ED:   Tmax 102     83    135/52     18/95%  WBC: 11.0    CMP: Cr 1.72  U/A: Large: Leuks   WBC > 100  Lactic Acid: 2.9 -> 1.5    Received:  - IVF  - Ceftriaxone 2mg    Feels much better after IVF and Ceftriaxone. Past Medical History:   Diagnosis Date    Arthritis     Ill-defined condition     vertigo    Thromboembolus [facility])      Past Surgical History:   Procedure Laterality Date    HX GYN      hysterectomy      History reviewed. No pertinent family history. Social History     Tobacco Use    Smoking status: Never Smoker    Smokeless tobacco: Never Used   Substance Use Topics    Alcohol use: Never      Prior to Admission medications    Medication Sig Start Date End Date Taking? Authorizing Provider   Biotin 2,500 mcg cap Take 2,500 mg by mouth daily. Other, MD Parker   mirabegron ER (Myrbetriq) 25 mg ER tablet Take 25 mg by mouth daily.     Provider, Yusra   apixaban (Eliquis) 5 mg tablet Take 5 mg by mouth two (2) times a day. Indications: blood clot in a deep vein of the extremities    Provider, Historical   vit a,c & e-lutein-minerals (OCUVITE) tablet 1 Tab daily. Parker Arroyo MD   dilTIAZem ER (CARDIZEM LA) 240 mg Tb24 tablet Take 240 mg by mouth daily. Parker Arroyo MD   furosemide (LASIX) 20 mg tablet Take 20 mg by mouth daily. Parker Arroyo MD   hydroCHLOROthiazide (HYDRODIURIL) 12.5 mg tablet Take 12.5 mg by mouth daily. Parker Arroyo MD   levothyroxine (SYNTHROID) 100 mcg tablet Take 100 mcg by mouth Daily (before breakfast). Parker Arroyo MD   meclizine (ANTIVERT) 12.5 mg tablet Take 12.5 mg by mouth three (3) times daily as needed. Parker Arroyo MD        Allergies   Allergen Reactions    Doxycycline Nausea and Vomiting       Review of Systems:  See HPI  Feels much better after therapy. Reports chills/tremor, which is new over the last few days. Reports much less weakness. Denies ongoing nausea or dysuria. Objective:     Vitals:    10/29/21 2145 10/29/21 2234 10/30/21 0036 10/30/21 0755   BP: 127/86 120/65  137/62   Pulse: 82 75  76   Resp: 18 20  20   Temp:  (!) 102 °F (38.9 °C) 98.5 °F (36.9 °C) 99.1 °F (37.3 °C)   SpO2: 100% 96%  96%   Weight:  94.6 kg (208 lb 9.6 oz)     Height:  4' 11\" (1.499 m)          Physical Exam:  Constitutional:       General: She is not in acute distress. Appearance: She is well-developed. She is obese. She is not diaphoretic. HENT:      Head: Normocephalic. Nose: Nose normal.   Eyes:      General: No scleral icterus. Conjunctiva/sclera: Conjunctivae normal.      Pupils: Pupils are equal, round, and reactive to light. Neck:      Thyroid: No thyromegaly. Vascular: No JVD. Trachea: No tracheal deviation. Cardiovascular:      Rate and Rhythm: Normal rate and regular rhythm. Heart sounds: No murmur heard. No friction rub. No gallop. Pulmonary:      Effort: Pulmonary effort is normal. No respiratory distress.       Breath sounds: Normal breath sounds. No stridor. No wheezing or rales. Abdominal:      General: Bowel sounds are normal. There is no distension. Palpations: Abdomen is soft. Tenderness: There is no abdominal tenderness. There is no guarding or rebound. Musculoskeletal:         General: Normal range of motion. Cervical back: Normal range of motion and neck supple. Right lower leg: Edema present. Left lower leg: Edema present. Comments: Plus edema up to the knee bilaterally with chronic stasis changes. Lymphadenopathy:      Cervical: No cervical adenopathy. Skin:     General: Skin is warm and dry. Findings: B/L erythema, warmth, and tederness over lower legs (she reports that it is longstanding)  Neurological:      Mental Status: She is alert and oriented to person, place, and time. Cranial Nerves: No cranial nerve deficit. Motor: No abnormal muscle tone. Coordination: Coordination normal.   Psychiatric:         Behavior: Behavior normal.        Assessment:     Hospital Problems  Date Reviewed: 9/13/2020        Codes Class Noted POA    [facility] ICD-10-CM: A41.9  ICD-9-CM: 038.9, 995.91  10/29/2021 Unknown          81 y.o. female with PMHx significant for hypertension, hypothyroid, recurrent UTIs presents via EMS to the ED with cc of UTI symptoms and weakness. Now much improved with therapy.     Plan:   1.) Sepsis 2/2 UTI  - continue Ceftriaxone  - IVF as needed  - Lactic Acid 2.9 -> 1.5  [ ] f/u Am labs  [ ] consider discussion with Urologist on Monday    2.) Chronic issues  - HTN: Diltiazem  - Hypothyroid: Synthroid  - Over Active bladder: trospium  - Hx Blood clots: Apixaban    DVT PPx: Apixaban  Diet: Regular  Dispo: Likely home Monday after 3 doses of Ceftriaxone +/-     Signed By: Ed Hunter MD     October 30, 2021

## 2021-10-30 NOTE — PROGRESS NOTES
Problem: Falls - Risk of  Goal: *Absence of Falls  Description: Document Walter Gant Fall Risk and appropriate interventions in the flowsheet.   Outcome: Progressing Towards Goal  Note: Fall Risk Interventions:  Mobility Interventions: Bed/chair exit alarm         Medication Interventions: Bed/chair exit alarm    Elimination Interventions: Call light in reach    History of Falls Interventions: Bed/chair exit alarm         Problem: General Medical Care Plan  Goal: *Vital signs within specified parameters  Outcome: Progressing Towards Goal  Goal: *Labs within defined limits  Outcome: Progressing Towards Goal  Goal: *Absence of infection signs and symptoms  Outcome: Progressing Towards Goal  Goal: *Optimal pain control at patient's stated goal  Outcome: Progressing Towards Goal  Goal: *Skin integrity maintained  Outcome: Progressing Towards Goal  Goal: *Fluid volume balance  Outcome: Progressing Towards Goal

## 2021-10-30 NOTE — ED NOTES
TRANSFER - OUT REPORT:    Verbal report given to Prakash Grant RN(name) on Chinmay Peres  being transferred to [facility] Bed 128(unit) for routine progression of care       Report consisted of patients Situation, Background, Assessment and   Recommendations(SBAR). Information from the following report(s) SBAR, ED Summary, Intake/Output, MAR, Accordion, Recent Results, Med Rec Status and Quality Measures was reviewed with the receiving nurse. Lines:   Peripheral IV 10/29/21 Left Wrist (Active)   Site Assessment Clean, dry, & intact 10/29/21 1847   Phlebitis Assessment 0 10/29/21 1847   Infiltration Assessment 0 10/29/21 1847   Dressing Status Clean, dry, & intact 10/29/21 1847   Dressing Type Transparent 10/29/21 1847   Hub Color/Line Status Green 10/29/21 1847   Alcohol Cap Used No 10/29/21 1847        Opportunity for questions and clarification was provided.       Patient transported with:  RN Supervisor to Room

## 2021-10-31 LAB
ALBUMIN SERPL-MCNC: 2.8 G/DL (ref 3.5–5)
ALBUMIN/GLOB SERPL: 0.7 {RATIO} (ref 1.1–2.2)
ALP SERPL-CCNC: 89 U/L (ref 45–117)
ALT SERPL-CCNC: 19 U/L (ref 12–78)
ANION GAP SERPL CALC-SCNC: 6 MMOL/L (ref 5–15)
AST SERPL-CCNC: 22 U/L (ref 15–37)
BACTERIA SPEC CULT: NORMAL
BILIRUB SERPL-MCNC: 0.5 MG/DL (ref 0.2–1)
BUN SERPL-MCNC: 14 MG/DL (ref 6–20)
BUN/CREAT SERPL: 10 (ref 12–20)
CALCIUM SERPL-MCNC: 8.8 MG/DL (ref 8.5–10.1)
CC UR VC: NORMAL
CHLORIDE SERPL-SCNC: 106 MMOL/L (ref 97–108)
CO2 SERPL-SCNC: 26 MMOL/L (ref 21–32)
CREAT SERPL-MCNC: 1.36 MG/DL (ref 0.55–1.02)
ERYTHROCYTE [DISTWIDTH] IN BLOOD BY AUTOMATED COUNT: 14.6 % (ref 11.5–14.5)
GLOBULIN SER CALC-MCNC: 3.8 G/DL (ref 2–4)
GLUCOSE SERPL-MCNC: 96 MG/DL (ref 65–100)
HCT VFR BLD AUTO: 39.9 % (ref 35–47)
HGB BLD-MCNC: 12.8 G/DL (ref 11.5–16)
MAGNESIUM SERPL-MCNC: 2.4 MG/DL (ref 1.6–2.4)
MCH RBC QN AUTO: 29.2 PG (ref 26–34)
MCHC RBC AUTO-ENTMCNC: 32.1 G/DL (ref 30–36.5)
MCV RBC AUTO: 90.9 FL (ref 80–99)
NRBC # BLD: 0 K/UL (ref 0–0.01)
NRBC BLD-RTO: 0 PER 100 WBC
PLATELET # BLD AUTO: 146 K/UL (ref 150–400)
PMV BLD AUTO: 10.9 FL (ref 8.9–12.9)
POTASSIUM SERPL-SCNC: 4.2 MMOL/L (ref 3.5–5.1)
PROT SERPL-MCNC: 6.6 G/DL (ref 6.4–8.2)
RBC # BLD AUTO: 4.39 M/UL (ref 3.8–5.2)
SERVICE CMNT-IMP: NORMAL
SODIUM SERPL-SCNC: 138 MMOL/L (ref 136–145)
WBC # BLD AUTO: 6.3 K/UL (ref 3.6–11)

## 2021-10-31 PROCEDURE — 99218 HC RM OBSERVATION: CPT

## 2021-10-31 PROCEDURE — 85027 COMPLETE CBC AUTOMATED: CPT

## 2021-10-31 PROCEDURE — 80053 COMPREHEN METABOLIC PANEL: CPT

## 2021-10-31 PROCEDURE — 36415 COLL VENOUS BLD VENIPUNCTURE: CPT

## 2021-10-31 PROCEDURE — 74011250637 HC RX REV CODE- 250/637: Performed by: INTERNAL MEDICINE

## 2021-10-31 PROCEDURE — 83735 ASSAY OF MAGNESIUM: CPT

## 2021-10-31 PROCEDURE — 74011000258 HC RX REV CODE- 258: Performed by: INTERNAL MEDICINE

## 2021-10-31 PROCEDURE — 74011250637 HC RX REV CODE- 250/637: Performed by: FAMILY MEDICINE

## 2021-10-31 PROCEDURE — 96376 TX/PRO/DX INJ SAME DRUG ADON: CPT

## 2021-10-31 PROCEDURE — 74011250636 HC RX REV CODE- 250/636: Performed by: INTERNAL MEDICINE

## 2021-10-31 RX ADMIN — CEFTRIAXONE SODIUM 1 G: 1 INJECTION, POWDER, FOR SOLUTION INTRAMUSCULAR; INTRAVENOUS at 20:02

## 2021-10-31 RX ADMIN — LEVOTHYROXINE SODIUM 100 MCG: 0.1 TABLET ORAL at 06:38

## 2021-10-31 RX ADMIN — DILTIAZEM HYDROCHLORIDE 240 MG: 240 CAPSULE, COATED, EXTENDED RELEASE ORAL at 09:59

## 2021-10-31 RX ADMIN — ACETAMINOPHEN 650 MG: 325 TABLET ORAL at 09:59

## 2021-10-31 RX ADMIN — APIXABAN 5 MG: 5 TABLET, FILM COATED ORAL at 09:59

## 2021-10-31 RX ADMIN — APIXABAN 5 MG: 5 TABLET, FILM COATED ORAL at 17:43

## 2021-10-31 RX ADMIN — ACETAMINOPHEN 650 MG: 325 TABLET ORAL at 22:37

## 2021-10-31 RX ADMIN — TROSPIUM CHLORIDE 20 MG: 20 TABLET, FILM COATED ORAL at 09:59

## 2021-10-31 NOTE — PROGRESS NOTES
Problem: Falls - Risk of  Goal: *Absence of Falls  Description: Document Anabella Minium Fall Risk and appropriate interventions in the flowsheet. Outcome: Progressing Towards Goal  Note: Fall Risk Interventions:  Mobility Interventions: Bed/chair exit alarm, Patient to call before getting OOB         Medication Interventions: Bed/chair exit alarm    Elimination Interventions: Bed/chair exit alarm, Call light in reach    History of Falls Interventions: Bed/chair exit alarm, Door open when patient unattended, Room close to nurse's station         Problem: Patient Education: Go to Patient Education Activity  Goal: Patient/Family Education  Outcome: Progressing Towards Goal     Problem: Pressure Injury - Risk of  Goal: *Prevention of pressure injury  Description: Document Phil Scale and appropriate interventions in the flowsheet.   Outcome: Progressing Towards Goal  Note: Pressure Injury Interventions:  Sensory Interventions: Assess changes in LOC    Moisture Interventions: Absorbent underpads    Activity Interventions: Increase time out of bed    Mobility Interventions: HOB 30 degrees or less    Nutrition Interventions: Document food/fluid/supplement intake                     Problem: Patient Education: Go to Patient Education Activity  Goal: Patient/Family Education  Outcome: Progressing Towards Goal

## 2021-10-31 NOTE — PROGRESS NOTES
Bedside and Verbal shift change report given to Nikolas Blake RN (oncoming nurse) by Jaqueline Conroy LPN (offgoing nurse). Report included the following information SBAR, Kardex, Intake/Output and MAR.

## 2021-10-31 NOTE — PROGRESS NOTES
Bedside shift change report given to [address] [place] [address] (oncoming nurse) by Armando Espinoza RN (offgoing nurse). Report included the following information Kardex and Intake/Output.

## 2021-10-31 NOTE — PROGRESS NOTES
Problem: Falls - Risk of  Goal: *Absence of Falls  Description: Document Celia Ingram Fall Risk and appropriate interventions in the flowsheet. Outcome: Progressing Towards Goal  Note: Fall Risk Interventions:  Mobility Interventions: Utilize walker, cane, or other assistive device, PT Consult for assist device competence, OT consult for ADLs, Patient to call before getting OOB         Medication Interventions: Patient to call before getting OOB, Bed/chair exit alarm    Elimination Interventions: Bed/chair exit alarm, Call light in reach    History of Falls Interventions: Bed/chair exit alarm, Door open when patient unattended         Problem: Pressure Injury - Risk of  Goal: *Prevention of pressure injury  Description: Document Phil Scale and appropriate interventions in the flowsheet.   Outcome: Progressing Towards Goal  Note: Pressure Injury Interventions:  Sensory Interventions: Assess changes in LOC    Moisture Interventions: Absorbent underpads, Apply protective barrier, creams and emollients, Internal/External urinary devices    Activity Interventions: Increase time out of bed, PT/OT evaluation    Mobility Interventions: PT/OT evaluation    Nutrition Interventions: Document food/fluid/supplement intake                     Problem: Urinary Tract Infection - Adult  Goal: *Absence of infection signs and symptoms  Outcome: Progressing Towards Goal     Problem: General Medical Care Plan  Goal: *Vital signs within specified parameters  Outcome: Progressing Towards Goal  Goal: *Labs within defined limits  Outcome: Progressing Towards Goal  Goal: *Absence of infection signs and symptoms  Outcome: Progressing Towards Goal  Goal: *Skin integrity maintained  Outcome: Progressing Towards Goal

## 2021-10-31 NOTE — PROGRESS NOTES
Progress Note    Patient: Vasile Rasmussen MRN: [id number]  SSN: [id number]    YOB: 1935  Age: 81 y.o. Sex: female      Admit Date: 10/29/2021    LOS: 0 days     Subjective:     Feels much better. Is more cheery. Without complaint. Eager to test her strength walking; told that PT would be in tomorrow. Objective:     Vitals:    10/30/21 1905 10/31/21 0046 10/31/21 0641 10/31/21 0802   BP:  (!) 152/65  (!) 144/66   Pulse:  67  62   Resp:  20  20   Temp: 99.1 °F (37.3 °C) 98 °F (36.7 °C)  98.3 °F (36.8 °C)   SpO2:  98%  93%   Weight:   96.5 kg (212 lb 11.2 oz)    Height:            Intake and Output:  Current Shift: 10/31 0701 - 10/31 1900  In: -   Out: 425 [Urine:425]  Last three shifts: 10/29 1901 - 10/31 0700  In: 1410 [P[address]O.:[address]; I.V.:1050]  Out: 4025 [Urine:4025]    Physical Exam:   Constitutional:       General: She is not in acute distress.     Appearance: She is well-developed. She is obese. She is not diaphoretic. HENT:      Head: Normocephalic.      Nose: Nose normal.   Eyes:      General: No scleral icterus.     Conjunctiva/sclera: Conjunctivae normal.      Pupils: Pupils are equal, round, and reactive to light. Neck:      Thyroid: No thyromegaly.      Vascular: No JVD.      Trachea: No tracheal deviation. Cardiovascular:      Rate and Rhythm: Normal rate and regular rhythm.      Heart sounds: No murmur heard. No friction rub. No gallop.    Pulmonary:      Effort: Pulmonary effort is normal. No respiratory distress.      Breath sounds: Normal breath sounds. No stridor. No wheezing or rales. Abdominal:      General: Bowel sounds are normal. There is no distension.      Palpations: Abdomen is soft.      Tenderness: There is no abdominal tenderness. There is no guarding or rebound. Musculoskeletal:         General: Normal range of motion.      Cervical back: Normal range of motion and neck supple.      Right lower leg: Edema present.      Left lower leg: Edema present.    Comments: Plus edema up to the knee bilaterally with chronic stasis changes.   Lymphadenopathy:      Cervical: No cervical adenopathy. Skin:     General: Skin is warm and dry.      Findings: B/L erythema, warmth, and tenderness over lower legs (she reports that it is longstanding)  Neurological:      Mental Status: She is alert and oriented to person, place, and time.      Cranial Nerves: No cranial nerve deficit.      Motor: No abnormal muscle tone.      Coordination: Coordination normal. Tremor almost gone. Psychiatric:         Behavior: Behavior normal.     Lab/Data Review:  Recent Results (from the past 24 hour(s))   CBC W/O DIFF    Collection Time: 10/31/21  6:15 AM   Result Value Ref Range    WBC 6.3 3.6 - 11.0 K/uL    RBC 4.39 3.80 - 5.20 M/uL    HGB 12.8 11.5 - 16.0 g/dL    HCT 39.9 35.0 - 47.0 %    MCV 90.9 80.0 - 99.0 FL    MCH 29.2 26.0 - 34.0 PG    MCHC 32.1 30.0 - 36.5 g/dL    RDW 14.6 (H) 11.5 - 14.5 %    PLATELET 390 (L) 446 - 400 K/uL    MPV 10.9 8.9 - 12.9 FL    NRBC 0.0 0  WBC    ABSOLUTE NRBC 0.00 0.00 - 9.55 K/uL   METABOLIC PANEL, COMPREHENSIVE    Collection Time: 10/31/21  6:15 AM   Result Value Ref Range    Sodium 138 136 - 145 mmol/L    Potassium 4.2 3.5 - 5.1 mmol/L    Chloride 106 97 - 108 mmol/L    CO2 26 21 - 32 mmol/L    Anion gap 6 5 - 15 mmol/L    Glucose 96 65 - 100 mg/dL    BUN 14 6 - 20 MG/DL    Creatinine 1.36 (H) 0.55 - 1.02 MG/DL    BUN/Creatinine ratio 10 (L) 12 - 20      GFR est AA 45 (L) >60 ml/min/1.73m2    GFR est non-AA 37 (L) >60 ml/min/1.73m2    Calcium 8.8 8.5 - 10.1 MG/DL    Bilirubin, total 0.5 0.2 - 1.0 MG/DL    ALT (SGPT) 19 12 - 78 U/L    AST (SGOT) 22 15 - 37 U/L    Alk.  phosphatase 89 45 - 117 U/L    Protein, total 6.6 6.4 - 8.2 g/dL    Albumin 2.8 (L) 3.5 - 5.0 g/dL    Globulin 3.8 2.0 - 4.0 g/dL    A-G Ratio 0.7 (L) 1.1 - 2.2     MAGNESIUM    Collection Time: 10/31/21  6:15 AM   Result Value Ref Range    Magnesium 2.4 1.6 - 2.4 mg/dL Assessment:     Active Problems:    Sepsis ([facility] (10/29/2021)    81 y.o. female with PMHx significant for hypertension, hypothyroid, recurrent UTIs presents via EMS to the ED with cc of UTI symptoms and weakness. Now much improved with therapy. Plan:   1.) Sepsis 2/2 UTI  - continue Ceftriaxone  - IVF as needed  - Lactic Acid 2.9 -> 1.5  - HOLDING Lasix and HCTZ  [ ] f/u Am labs  [ ] consider discussion with Urologist on Monday     2.) Chronic issues  - HTN: Diltiazem  - Hypothyroid: Synthroid  - Over Active bladder: trospium  - Hx Blood clots: Apixaban  [ ] f/u PT recs     DVT PPx: Apixaban  Diet: Regular  Dispo: Likely home Monday after 3 doses of Ceftriaxone +/-. See PT recs Monday.     Signed By: Kimberly Terrazas MD     October 31, 2021

## 2021-11-01 VITALS
SYSTOLIC BLOOD PRESSURE: 159 MMHG | HEART RATE: 76 BPM | OXYGEN SATURATION: 97 % | BODY MASS INDEX: 42.86 KG/M2 | WEIGHT: 212.6 LBS | RESPIRATION RATE: 16 BRPM | TEMPERATURE: 98.4 F | DIASTOLIC BLOOD PRESSURE: 63 MMHG | HEIGHT: 59 IN

## 2021-11-01 LAB
ALBUMIN SERPL-MCNC: 2.7 G/DL (ref 3.5–5)
ALBUMIN/GLOB SERPL: 0.7 {RATIO} (ref 1.1–2.2)
ALP SERPL-CCNC: 95 U/L (ref 45–117)
ALT SERPL-CCNC: 29 U/L (ref 12–78)
ANION GAP SERPL CALC-SCNC: 9 MMOL/L (ref 5–15)
AST SERPL-CCNC: 22 U/L (ref 15–37)
BILIRUB SERPL-MCNC: 0.4 MG/DL (ref 0.2–1)
BUN SERPL-MCNC: 15 MG/DL (ref 6–20)
BUN/CREAT SERPL: 13 (ref 12–20)
CALCIUM SERPL-MCNC: 8.7 MG/DL (ref 8.5–10.1)
CHLORIDE SERPL-SCNC: 105 MMOL/L (ref 97–108)
CO2 SERPL-SCNC: 26 MMOL/L (ref 21–32)
COMMENT, HOLDF: NORMAL
CREAT SERPL-MCNC: 1.2 MG/DL (ref 0.55–1.02)
ERYTHROCYTE [DISTWIDTH] IN BLOOD BY AUTOMATED COUNT: 14.4 % (ref 11.5–14.5)
GLOBULIN SER CALC-MCNC: 3.9 G/DL (ref 2–4)
GLUCOSE SERPL-MCNC: 91 MG/DL (ref 65–100)
HCT VFR BLD AUTO: 37.7 % (ref 35–47)
HGB BLD-MCNC: 12.2 G/DL (ref 11.5–16)
MAGNESIUM SERPL-MCNC: 2.4 MG/DL (ref 1.6–2.4)
MCH RBC QN AUTO: 29.3 PG (ref 26–34)
MCHC RBC AUTO-ENTMCNC: 32.4 G/DL (ref 30–36.5)
MCV RBC AUTO: 90.6 FL (ref 80–99)
NRBC # BLD: 0 K/UL (ref 0–0.01)
NRBC BLD-RTO: 0 PER 100 WBC
PLATELET # BLD AUTO: 195 K/UL (ref 150–400)
PMV BLD AUTO: 8.6 FL (ref 8.9–12.9)
POTASSIUM SERPL-SCNC: 3.9 MMOL/L (ref 3.5–5.1)
PROT SERPL-MCNC: 6.6 G/DL (ref 6.4–8.2)
RBC # BLD AUTO: 4.16 M/UL (ref 3.8–5.2)
SAMPLES BEING HELD,HOLD: NORMAL
SODIUM SERPL-SCNC: 140 MMOL/L (ref 136–145)
WBC # BLD AUTO: 5.3 K/UL (ref 3.6–11)

## 2021-11-01 PROCEDURE — 36415 COLL VENOUS BLD VENIPUNCTURE: CPT

## 2021-11-01 PROCEDURE — 83735 ASSAY OF MAGNESIUM: CPT

## 2021-11-01 PROCEDURE — 85027 COMPLETE CBC AUTOMATED: CPT

## 2021-11-01 PROCEDURE — 74011250637 HC RX REV CODE- 250/637: Performed by: INTERNAL MEDICINE

## 2021-11-01 PROCEDURE — 74011250636 HC RX REV CODE- 250/636: Performed by: INTERNAL MEDICINE

## 2021-11-01 PROCEDURE — 65270000029 HC RM PRIVATE

## 2021-11-01 PROCEDURE — 90471 IMMUNIZATION ADMIN: CPT

## 2021-11-01 PROCEDURE — 97161 PT EVAL LOW COMPLEX 20 MIN: CPT

## 2021-11-01 PROCEDURE — 97535 SELF CARE MNGMENT TRAINING: CPT

## 2021-11-01 PROCEDURE — 97165 OT EVAL LOW COMPLEX 30 MIN: CPT

## 2021-11-01 PROCEDURE — 90686 IIV4 VACC NO PRSV 0.5 ML IM: CPT | Performed by: INTERNAL MEDICINE

## 2021-11-01 PROCEDURE — 74011250637 HC RX REV CODE- 250/637: Performed by: FAMILY MEDICINE

## 2021-11-01 PROCEDURE — 80053 COMPREHEN METABOLIC PANEL: CPT

## 2021-11-01 PROCEDURE — 99218 HC RM OBSERVATION: CPT

## 2021-11-01 RX ORDER — CEFDINIR 300 MG/1
300 CAPSULE ORAL 2 TIMES DAILY
Qty: 6 CAPSULE | Refills: 0 | Status: SHIPPED | OUTPATIENT
Start: 2021-11-02 | End: 2021-11-05

## 2021-11-01 RX ORDER — GLUCOSAMINE/CHONDR SU A SOD 750-600 MG
2500 TABLET ORAL DAILY
Qty: 1 CAPSULE | Refills: 0 | Status: SHIPPED
Start: 2021-11-01

## 2021-11-01 RX ADMIN — Medication 10 ML: at 06:16

## 2021-11-01 RX ADMIN — LEVOTHYROXINE SODIUM 100 MCG: 0.1 TABLET ORAL at 06:16

## 2021-11-01 RX ADMIN — DILTIAZEM HYDROCHLORIDE 240 MG: 240 CAPSULE, COATED, EXTENDED RELEASE ORAL at 09:56

## 2021-11-01 RX ADMIN — ACETAMINOPHEN 650 MG: 325 TABLET ORAL at 15:10

## 2021-11-01 RX ADMIN — MECLIZINE 12.5 MG: 12.5 TABLET ORAL at 15:11

## 2021-11-01 RX ADMIN — APIXABAN 5 MG: 5 TABLET, FILM COATED ORAL at 09:57

## 2021-11-01 RX ADMIN — INFLUENZA VIRUS VACCINE 0.5 ML: 15; 15; 15; 15 SUSPENSION INTRAMUSCULAR at 15:11

## 2021-11-01 RX ADMIN — TROSPIUM CHLORIDE 20 MG: 20 TABLET, FILM COATED ORAL at 09:57

## 2021-11-01 NOTE — PROGRESS NOTES
Care Management Interventions  PCP Verified by CM: Yes (Mike Gupta Filipe )  Palliative Care Criteria Met (RRAT>21 & CHF Dx)?: No (No [place] order)  Mode of Transport at Discharge: Other (see comment) (POV)  Transition of Care Consult (CM Consult): Discharge Planning  Physical Therapy Consult: Yes  Occupational Therapy Consult: Yes  Support Systems: Child(jesi)  Confirm Follow Up Transport: Family  The Plan for Transition of Care is Related to the Following Treatment Goals : Treat sepsis  Discharge Location  Discharge Placement: Home with home health    Patient lives at home alone. Is normally independent. She has a variety of plants on her porch that she keeps watered everyday. She uses a cane outside and a walker inside. She has a daughter named Yuni Rosa that lives 20 min away and another daughter in [place] also named Caio Savage who is recovering from heart issues. She says her daughters are very supportive. She said she's had home health in the past from [employer] but couldn't remember the company's name. Patient states she has no concerns for discharge at this point in time. Patient is in observation status. Arturo explained to patient. She stated understanding and signed both letters. Signed letters placed in chart and carbon copies given to patient. Told patient to contact care management if she had any further questions or concerns.        Reason for Admission:  Sepsis                      RUR Score:   RUR: N/A PT IN OBS STATUS                   RRAT: 4 LOW     Plan for utilizing home health:      TBD     PCP: First and Last name:  Jose Luis Griggs NP     Name of Practice: [address]   Are you a current patient: Yes/No: yes   Approximate date of last visit:    Can you participate in a virtual visit with your PCP: Yes                    Current Advanced Directive/Advance Care Plan: Full Code      Healthcare Decision Maker:   Click here to complete soup.me including selection of the Healthcare Decision Maker Relationship (ie \"Primary\")                             Transition of Care Plan:                 Home when medically stable

## 2021-11-01 NOTE — PROGRESS NOTES
PHYSICAL THERAPY EVALUATION/DISCHARGE  Patient: Linda Villegas (14 y.o. female)  Date: 11/1/2021  Primary Diagnosis: Sepsis ([facility] [A41.9]              ASSESSMENT  Pt. Received upright in chair and agreeable to PT. Pt. Stand by assistance for all functional transfers and ambulation of 150ft outside of room with a rolling walker. Pt. Did not demonstrate any outstanding deviations outside of [facility] for ambulation. Pt. Able to ambulate functional household distances. Pt. Appears to be at functional baseline currently and would benefit from HHPT for further functional endurance in the household and community. Pt. To be signed off of acute care PT. Other factors to consider for discharge: home set up     Further skilled acute physical therapy is not indicated at this time. PLAN :  Recommendation for discharge: (in order for the patient to meet his/her long term goals)  Physical therapy at least 2 days/week in the home     This discharge recommendation:  Has not yet been discussed the attending provider and/or case management    IF patient discharges home will need the following DME: patient owns DME required for discharge       SUBJECTIVE:   Patient stated Im able to get around just fine, this UTI has just been giving me trouble.     OBJECTIVE DATA SUMMARY:   HISTORY:    Past Medical History:   Diagnosis Date    Arthritis     Ill-defined condition     vertigo    Thromboembolus ([facility]      Past Surgical History:   Procedure Laterality Date    HX GYN      hysterectomy       Home Situation  Home Environment: Private residence  One/Two Story Residence: One story  Living Alone: Yes  Support Systems: Child(jesi)  Patient Expects to be Discharged to[de-identified] House  Current DME Used/Available at Home: Walker, rolling, Cane, straight  Tub or Shower Type: Shower    EXAMINATION/PRESENTATION/DECISION MAKING:   Critical Behavior:  Neurologic State: Alert  Orientation Level: Oriented X4  Cognition: Follows commands Hearing: Auditory  Auditory Impairment: Hard of hearing, bilateral  Strength:          RLE Assessment (WDL): Within defined limits        LLE Assessment (WDL): Within defined limits  Functional Mobility:  Bed Mobility:  Rolling: Modified independent  Supine to Sit: Modified independent  Transfers:  Sit to Stand: Contact guard assistance  Stand to Sit: Supervision        Bed to Chair: Contact guard assistance  Ambulation/Gait Training:  Distance (ft): 150 Feet (ft)  Assistive Device: Walker, rolling;Gait belt  Ambulation - Level of Assistance: Supervision     Gait Description (WDL): Exceptions to WDL  Gait Abnormalities: Decreased step clearance           Physical Therapy Evaluation Charge Determination   History Examination Presentation Decision-Making   LOW Complexity : Zero comorbidities / personal factors that will impact the outcome / POC LOW Complexity : 1-2 Standardized tests and measures addressing body structure, function, activity limitation and / or participation in recreation  LOW Complexity : Stable, uncomplicated  LOW Complexity : FOTO score of       Based on the above components, the patient evaluation is determined to be of the following complexity level: LOW     Pain Ratin/10    Activity Tolerance: WNL      After treatment patient left in no apparent distress:   Sitting in chair    COMMUNICATION/EDUCATION:   The patients plan of care was discussed with: Physical therapist and Rehabilitation technician. Patient/family have participated as able in goal setting and plan of care.     Thank you for this referral.  Evelin Lugo PT, DPT, EP-C   Time Calculation: 12 mins

## 2021-11-01 NOTE — PROGRESS NOTES
Problem: Falls - Risk of  Goal: *Absence of Falls  Description: Document Lenora Keating Fall Risk and appropriate interventions in the flowsheet. Outcome: Resolved/Met  Note: Fall Risk Interventions:  Mobility Interventions: Bed/chair exit alarm, Utilize walker, cane, or other assistive device         Medication Interventions: Bed/chair exit alarm, Patient to call before getting OOB, Teach patient to arise slowly    Elimination Interventions: Bed/chair exit alarm, Call light in reach, Stay With Me (per policy), Toilet paper/wipes in reach    History of Falls Interventions: Bed/chair exit alarm, Door open when patient unattended, Room close to nurse's station         Problem: Patient Education: Go to Patient Education Activity  Goal: Patient/Family Education  Outcome: Resolved/Met     Problem: Pressure Injury - Risk of  Goal: *Prevention of pressure injury  Description: Document Phil Scale and appropriate interventions in the flowsheet.   Outcome: Resolved/Met  Note: Pressure Injury Interventions:  Sensory Interventions: Assess changes in LOC, Keep linens dry and wrinkle-free, Minimize linen layers    Moisture Interventions: Absorbent underpads, Minimize layers    Activity Interventions: Increase time out of bed    Mobility Interventions: PT/OT evaluation    Nutrition Interventions: Document food/fluid/supplement intake                     Problem: Patient Education: Go to Patient Education Activity  Goal: Patient/Family Education  Outcome: Resolved/Met     Problem: Urinary Tract Infection - Adult  Goal: *Absence of infection signs and symptoms  Outcome: Resolved/Met     Problem: Patient Education: Go to Patient Education Activity  Goal: Patient/Family Education  Outcome: Resolved/Met     Problem: General Medical Care Plan  Goal: *Vital signs within specified parameters  Outcome: Resolved/Met  Goal: *Labs within defined limits  Outcome: Resolved/Met  Goal: *Absence of infection signs and symptoms  Outcome: Resolved/Met  Goal: *Skin integrity maintained  Outcome: Resolved/Met     Problem: Patient Education: Go to Patient Education Activity  Goal: Patient/Family Education  Outcome: Resolved/Met

## 2021-11-01 NOTE — PROGRESS NOTES
Care Management Interventions  PCP Verified by CM: Yes ([facility] )  Palliative Care Criteria Met (RRAT>21 & CHF Dx)?: No (No MD order)  Mode of Transport at Discharge: Other (see comment) (POV)  Transition of Care Consult (CM Consult): Discharge Planning  Physical Therapy Consult: Yes  Occupational Therapy Consult: Yes  Support Systems: Child(jesi)  Confirm Follow Up Transport: Family  The Plan for Transition of Care is Related to the Following Treatment Goals : Treat sepsis  Discharge Location  Discharge Placement: Home    Patient is discharged and will be returning back to her home. Patient didn't say if she would like home health or not. She said she does not need any DME or anything else. Patient made inpatient today. Medicare pt has received, reviewed, and signed 1st IM letter informing them of their right to appeal the discharge. Signed copy has been placed on pt bedside chart. Patient told to contact care management for any questions or concerns. She stated understanding. RUR: 8%    Transition of Care (SHARONA) Plan:  Home     SHARONA Transportation:       How is patient being transported at discharge? Family POV      When? Today     Is transport scheduled? N/a     Follow-up appointment and transportation:     PCP? Miguel Delarosa 11/8/21 at 1:30pm      Who is transporting to the follow-up appointment? POV      Is transport for follow up appointment scheduled? n/a    Communication plan (with patient/family): Patient aware and agrees with discharge plan. Who is being called? Patient or Next of Kin? Responsible party? Patient       What number(s) is to be used? [phone]      What service provider is calling for [facility] services? [address]       When are they calling?  24--48 hours prior to appointment       Click here to complete [facility] including selection of the Healthcare Decision Maker Relationship (ie \"Primary\")  [email or web address][employer]

## 2021-11-01 NOTE — PROGRESS NOTES
Problem: Falls - Risk of  Goal: *Absence of Falls  Description: Document Basilio Gonzalez Fall Risk and appropriate interventions in the flowsheet. Outcome: Progressing Towards Goal  Note: Fall Risk Interventions:  Mobility Interventions: Utilize walker, cane, or other assistive device         Medication Interventions: Bed/chair exit alarm, Patient to call before getting OOB, Teach patient to arise slowly    Elimination Interventions: Bed/chair exit alarm, Call light in reach, Patient to call for help with toileting needs, Stay With Me (per policy)    History of Falls Interventions: Bed/chair exit alarm, Door open when patient unattended         Problem: Patient Education: Go to Patient Education Activity  Goal: Patient/Family Education  Outcome: Progressing Towards Goal     Problem: Pressure Injury - Risk of  Goal: *Prevention of pressure injury  Description: Document Phil Scale and appropriate interventions in the flowsheet.   Outcome: Progressing Towards Goal  Note: Pressure Injury Interventions:  Sensory Interventions: Assess changes in LOC, Keep linens dry and wrinkle-free, Minimize linen layers    Moisture Interventions: Absorbent underpads, Minimize layers    Activity Interventions: Increase time out of bed    Mobility Interventions: PT/OT evaluation    Nutrition Interventions: Document food/fluid/supplement intake                     Problem: Patient Education: Go to Patient Education Activity  Goal: Patient/Family Education  Outcome: Progressing Towards Goal     Problem: Urinary Tract Infection - Adult  Goal: *Absence of infection signs and symptoms  Outcome: Progressing Towards Goal     Problem: Patient Education: Go to Patient Education Activity  Goal: Patient/Family Education  Outcome: Progressing Towards Goal     Problem: General Medical Care Plan  Goal: *Vital signs within specified parameters  Outcome: Progressing Towards Goal  Goal: *Labs within defined limits  Outcome: Progressing Towards Goal  Goal: *Absence of infection signs and symptoms  Outcome: Progressing Towards Goal  Goal: *Optimal pain control at patient's stated goal  Outcome: Resolved/Met  Goal: *Skin integrity maintained  Outcome: Progressing Towards Goal  Goal: *Optimize nutritional status  Outcome: Resolved/Met  Goal: *Anxiety reduced or absent  Outcome: Resolved/Met  Goal: *Progressive mobility and function (eg: ADL's)  Outcome: Resolved/Met     Problem: Patient Education: Go to Patient Education Activity  Goal: Patient/Family Education  Outcome: Progressing Towards Goal

## 2021-11-01 NOTE — DISCHARGE INSTRUCTIONS
You were admitted for treatment of a urinary tract infection. Your symptoms improved with IV antibiotics. You are not having any fevers and your blood cultures are not growing any bacteria. Please take 3 more days of antibiotics, starting tomorrow, even if you feel back to your baseline. Follow-up with your primary care doctor in 1 to 2 weeks. DISCHARGE SUMMARY from Nurse    PATIENT INSTRUCTIONS:    After general anesthesia or intravenous sedation, for 24 hours or while taking prescription Narcotics:  · Limit your activities  · Do not drive and operate hazardous machinery  · Do not make important personal or business decisions  · Do  not drink alcoholic beverages  · If you have not urinated within 8 hours after discharge, please contact your surgeon on call. Report the following to your surgeon:  · Excessive pain, swelling, redness or odor of or around the surgical area  · Temperature over 100.5  · Nausea and vomiting lasting longer than 4 hours or if unable to take medications  · Any signs of decreased circulation or nerve impairment to extremity: change in color, persistent  numbness, tingling, coldness or increase pain  · Any questions    What to do at Home:  Recommended activity: Activity as tolerated,     If you experience any of the following symptoms recurrent symptoms, please follow up with PCP or return to ED. *  Please give a list of your current medications to your Primary Care Provider. *  Please update this list whenever your medications are discontinued, doses are      changed, or new medications (including over-the-counter products) are added. *  Please carry medication information at all times in case of emergency situations. These are general instructions for a healthy lifestyle:    No smoking/ No tobacco products/ Avoid exposure to second hand smoke  Surgeon General's Warning:  Quitting smoking now greatly reduces serious risk to your health.     Obesity, smoking, and sedentary lifestyle greatly increases your risk for illness    A healthy diet, regular physical exercise & weight monitoring are important for maintaining a healthy lifestyle    You may be retaining fluid if you have a history of heart failure or if you experience any of the following symptoms:  Weight gain of 3 pounds or more overnight or 5 pounds in a week, increased swelling in our hands or feet or shortness of breath while lying flat in bed. Please call your doctor as soon as you notice any of these symptoms; do not wait until your next office visit. The discharge information has been reviewed with the patient. The patient verbalized understanding. Discharge medications reviewed with the patient and appropriate educational materials and side effects teaching were provided.   ___________________________________________________________________________________________________________________________________

## 2021-11-01 NOTE — PROGRESS NOTES
OCCUPATIONAL THERAPY EVALUATION/DISCHARGE  Patient: Angelina Peng (52 y.o. female)  Date: 11/1/2021  Primary Diagnosis: Sepsis ([address] [A41.9]       Precautions:     ASSESSMENT  Based on the objective data described below, the patient presents with UTI. She had reported weakness and nausea on admission with dx of sepsis. She feels better and feels that she is able to perform tasks mostly as if she was at home. Difficulty with gerry-care at toilet asking for assist stating her toilet is higher at home and is set-up so she can get back there to clean herself. She states she can perform self care as before and was independent with washing frontside and grooming with set-up while seated in chair. Able to get to EOB on her own and transfers/ambulates with Supervision to [facility] using walker. Pt appears to be at baseline. Current Level of Function (ADLs/self-care): for safety ambulates with at least supervision in hospital but is at baseline for self care. Functional Outcome Measure: The patient scored 75 on the Barthel Index outcome measure which is indicative of mild impairment related to ambulation here with supervision. Other factors to consider for discharge: pt feels stronger     PLAN :  Recommend with staff: supervise when up to toilet    Recommendation for discharge: (in order for the patient to meet his/her long term goals)  No skilled occupational therapy/ follow up rehabilitation needs identified at this time. This discharge recommendation:  Has been made in collaboration with the attending provider and/or case management    IF patient discharges home will need the following DME: none       SUBJECTIVE:   Patient stated Oh I can do this.   Re: getting on pants/socks at home  OBJECTIVE DATA SUMMARY:   HISTORY:   Past Medical History:   Diagnosis Date    Arthritis     Ill-defined condition     vertigo    Thromboembolus [facility])      Past Surgical History:   Procedure Laterality Date    HX GYN hysterectomy       Prior Level of Function/Environment/Context: Independent at home with walker  Expanded or extensive additional review of patient history:   Home Situation  Home Environment: Private residence  [address] [place] [address] St: One story  Living Alone: Yes  Support Systems: Child(jesi)  Patient Expects to be Discharged to[de-identified] House  Current DME Used/Available at Home: Walker, rolling, Cane, straight  Tub or Shower Type: Shower    Hand dominance: Right    EXAMINATION OF PERFORMANCE DEFICITS:  Cognitive/Behavioral Status:  Neurologic State: Alert  Orientation Level: Oriented X4    Skin: intact    Edema: no swelling noted    Hearing: Auditory  Auditory Impairment: Hard of hearing, bilateral    Vision/Perceptual:    No glasses in room    Range of Motion:  WFL BUE     Strength:  4/5 B shoulders  5/5 B elbow     Coordination:     Fine Motor Skills-Upper: Left Intact; Right Intact    Gross Motor Skills-Upper: Left Intact; Right Intact    Tone & Sensation:  Normal tone and sensation       Balance:  Sitting: Intact  Standing: With support; Intact    Functional Mobility and Transfers for ADLs:  Bed Mobility:  Rolling: Modified independent  Supine to Sit: Modified independent    Transfers:  Sit to Stand: Contact guard assistance  Stand to Sit: Supervision  Bed to Chair: Contact guard assistance  Bathroom Mobility: Contact guard assistance  Toilet Transfer : Contact guard assistance    ADL Assessment:  Feeding: Independent    Oral Facial Hygiene/Grooming: Setup    Bathing: Setup    Upper Body Dressing: Setup    Lower Body Dressing: Setup      ADL Intervention and task modifications:  Feeding  Feeding Assistance: Independent    Lower Body Bathing  Perineal  : Maximum assistance  Position Performed: Standing    Lower Body Dressing Assistance  Protective Undergarmet: Set-up    Toileting  Bladder Hygiene: Modified independent  Bowel Hygiene: Modified indpendent;Maximum assistance    Functional Measure:    Barthel Index:  Bathin  Bladder: 5  Bowels: 10  Groomin  Dressing: 10  Feeding: 10  Mobility: 10  Stairs: 5  Toilet Use: 5  Transfer (Bed to Chair and Back): 10  Total: 75/100      The Barthel ADL Index: Guidelines  1. The index should be used as a record of what a patient does, not as a record of what a patient could do. 2. The main aim is to establish degree of independence from any help, physical or verbal, however minor and for whatever reason. 3. The need for supervision renders the patient not independent. 4. A patient's performance should be established using the best available evidence. Asking the patient, friends/relatives and nurses are the usual sources, but direct observation and common sense are also important. However direct testing is not needed. 5. Usually the patient's performance over the preceding 24-48 hours is important, but occasionally longer periods will be relevant. 6. Middle categories imply that the patient supplies over 50 per cent of the effort. 7. Use of aids to be independent is allowed. Score Interpretation (from [address] [address])    Independent   60-79 Minimally independent   40-59 Partially dependent   20-39 Very dependent   <20 Totally dependent     -Meka Grimaldo., Barthel, D.W. (1965). Functional evaluation: the Barthel Index. [facility] ([address] (1997). The Barthel activities of daily living index: self-reporting versus actual performance in the old (> or = 75 years). Journal of [address] [place] [address] 45(7), [address] [facility], J.J.M.F, Tess Balderas., [place]. (1999). Measuring the change in disability after inpatient rehabilitation; comparison of the responsiveness of the Barthel Index and Functional Graham Measure. Journal of Neurology, Neurosurgery, and Psychiatry, 66(4), 358-576.   -Min Lugo, N.J.A, GEOVANNA Sims, & Lenora Oleary, M.A. (2004) Assessment of post-stroke quality of life in cost-effectiveness studies: The usefulness of the Barthel Index and the EuroQoL-5D.  Quality of Life Research, 15, 145-17       Occupational Therapy Evaluation Charge Determination   History Examination Decision-Making   LOW Complexity : Brief history review  LOW Complexity : 1-3 performance deficits relating to physical, cognitive , or psychosocial skils that result in activity limitations and / or participation restrictions  LOW Complexity : No comorbidities that affect functional and no verbal or physical assistance needed to complete eval tasks       Based on the above components, the patient evaluation is determined to be of the following complexity level: LOW   Pain Ratin/10    Activity Tolerance:   Good    After treatment patient left in no apparent distress:    Sitting in chair, Call bell within reach and Bed / chair alarm activated    COMMUNICATION/EDUCATION:   The patients plan of care was discussed with: Physical therapist.     Thank you for this referral.  Teresa Nazario OT  Time Calculation: 25 mins

## 2021-11-01 NOTE — PROGRESS NOTES
Discharge instructions reviewed with pt.  And her daughter  Personal belongings returned: clothing and cell phone  Home meds returned: n/a  To front entrance via wheelchair  Discharged home with  daughter @ [phone]

## 2021-11-01 NOTE — PROGRESS NOTES
Spiritual Care Assessment/Progress Note  Jesse Fuentes      NAME: Kalyn Graves      MRN: [id number]  AGE: 81 y.o.  SEX: female  Sabianism Affiliation: Religious   Language: English     11/1/2021     Total Time (in minutes): 13     Spiritual Assessment begun in [facility] through conversation with:         [x]Patient        [] Family    [] Friend(s)        Reason for Consult: Initial/Spiritual assessment, patient floor     Spiritual beliefs: (Please include comment if needed)     [x] Identifies with a stanley tradition:         [] Supported by a stanley community:            [] Claims no spiritual orientation:           [] Seeking spiritual identity:                [] Adheres to an individual form of spirituality:           [] Not able to assess:                           Identified resources for coping:      [] Prayer                               [] Music                  [] Guided Imagery     [x] Family/friends                 [] Pet visits     [] Devotional reading                         [] Unknown     [] Other                                              Interventions offered during this visit: (See comments for more details)    Patient Interventions: Affirmation of emotions/emotional suffering, Affirmation of stanley, Iconic (affirming the presence of God/Higher Power), Initial/Spiritual assessment, patient floor           Plan of Care:     [x] Support spiritual and/or cultural needs    [] Support AMD and/or advance care planning process      [] Support grieving process   [] Coordinate Rites and/or Rituals    [] Coordination with community clergy   [] No spiritual needs identified at this time   [] Detailed Plan of Care below (See Comments)  [] Make referral to Music Therapy  [] Make referral to Pet Therapy     [] Make referral to Addiction services  [] Make referral to [facility]  [] Make referral to Spiritual Care Partner  [] No future visits requested        [] Follow up upon further referrals     Comments:Initial spiritual assessment in [address]. Patient/family shared about her medical issues. Provided empathic listening and spiritual support. Advised of  Availability.   [address] [place] [address]

## 2021-11-02 NOTE — PROGRESS NOTES
Attempted to contact the patient, but had to leave a message. Encouraged her to call Care Management if she should have any needs, issues, concerns or questions.

## 2021-11-03 NOTE — DISCHARGE SUMMARY
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 Please follow-up tests/labs that are still pendin. None     DISCHARGE SUMMARY/HOSPITAL COURSE: for full details see H&P, daily progress notes, labs, consult notes. Briefly As Per HPI:    Michelle Yang is a 81 y.o. female with PMHx significant for hypertension, hypothyroid, blood clots, recurrent UTIs presents via EMS to the ED with cc of UTI symptoms and weakness.  Patient reports she was recently diagnosed by her urologist with a UTI and appears to have been started on Bactrim which she reports made her feel terrible. She has had 3 doses so far. Patient was admitted to the hospital for sepsis secondary to UTI, lactic acidosis, and an acute kidney injury. Her MICHAELA resolved with IV fluids. She was treated with IV antibiotics and had improvement in her clinical condition. Blood cultures did not have any growth. Urine culture was negative because patient received Bactrim previously. She was discharged with cefdinir to complete a course of antibiotics. _______________________________________________________________________   Patient seen and examined by me on day of discharge. Pertinent findings are:  Vitals:  Visit Vitals  BP (!) 159/63 (BP 1 Location: Right upper arm, BP Patient Position: At rest)   Pulse 76   Temp 98.4 °F (36.9 °C)   Resp 16   Ht 4' 11\" (1.499 m)   Wt 96.4 kg (212 lb 9.6 oz)   SpO2 97%   BMI 42.94 kg/m²     No data recorded.       Last 24hr Input/Output:  No intake or output data in the 24 hours ending 11/02/21 2105     PHYSICAL EXAM:   Gen: Sitting in bed in NAD  HEENT: EOMI, sclera anicteric  CV: Normal rate, RR, no r/m/g, brisk cap refill  Pulm: Nl WOB, CTAB  GI: Abd soft, nontender and nondistended, +BS  Ext: WWP, no LE edema  Skin: No rashes noted  Neuro: No gross focal deficits, AAOx3        See Discharge Instructions for further details. _______________________________________________________________________  DISPOSITION:    Home with Family:    Home with HH/PT/OT/RN:    SNF/LTC:    [facility]:    OTHER:    ________________________________________________________________________  Medications Reviewed:  Discharge Medication List as of 11/1/2021  4:07 PM      START taking these medications    Details   cefdinir (OMNICEF) 300 mg capsule Take 1 Capsule by mouth two (2) times a day for 3 days. , Normal, Disp-6 Capsule, R-0         CONTINUE these medications which have CHANGED    Details   Biotin 2,500 mcg cap Take 2,500 mcg by mouth daily. , No Print, Disp-1 Capsule, R-0         CONTINUE these medications which have NOT CHANGED    Details   mirabegron ER (Myrbetriq) 25 mg ER tablet Take 25 mg by mouth every evening. Indications: a condition where the urge to urinate results in urine leakage, Historical Med      apixaban (Eliquis) 5 mg tablet Take 5 mg by mouth two (2) times a day. Indications: blood clot in a deep vein of the extremities, Historical Med      vit a,c & e-lutein-minerals (OCUVITE) tablet 1 Tablet two (2) times a day., Historical Med      dilTIAZem ER (CARDIZEM LA) 240 mg Tb24 tablet Take 240 mg by mouth every evening., Historical Med      furosemide (LASIX) 20 mg tablet Take 20 mg by mouth daily as needed (swelling in legs). , Historical Med      hydroCHLOROthiazide (HYDRODIURIL) 12.5 mg tablet Take 12.5 mg by mouth daily. Will try to take in morning, Historical Med      levothyroxine (SYNTHROID) 100 mcg tablet Take 100 mcg by mouth Daily (before breakfast). , Historical Med      meclizine (ANTIVERT) 12.5 mg tablet Take 12.5 mg by mouth three (3) times daily as needed (vertigo). , Historical Med             PMH/SH reviewed - no change compared to H&P  ________________________________________________________________________    Recommended diet:  None    Recommended activity:Activity as tolerated     Indwelling devices:  None    Supplemental Oxygen: None    Required Lab work: Per SNF routine    Glucose management:  None    Code status: Full      Follow Up: Follow-up Information     Follow up With Specialties Details Why Contact Izzy Durand NP Nurse Practitioner In 1 week Monday 11/08/2021 @ 1:30 pm [address]  [phone]             ______________________________________________________________________  Total time spent in discharge (min): >35 min   ________________________________________________________________________    Care Plan discussed with:    Comments   Patient x    Family      RN x    Care Manager x                   Consultant:      ________________________________________________________________________  Attending Physician: Rochel Dandy, MD  ________________________________________________________________________  **Lab Data Reviewed:  No results found for this or any previous visit (from the past 24 hour(s)). XR CHEST PORT   Final Result   No acute infiltrate.         [unfilled]

## 2021-11-04 LAB
BACTERIA SPEC CULT: NORMAL
BACTERIA SPEC CULT: NORMAL
SERVICE CMNT-IMP: NORMAL
SERVICE CMNT-IMP: NORMAL

## 2021-11-08 LAB
ATRIAL RATE: 80 BPM
CALCULATED P AXIS, ECG09: 79 DEGREES
CALCULATED R AXIS, ECG10: -74 DEGREES
CALCULATED T AXIS, ECG11: 57 DEGREES
DIAGNOSIS, 93000: NORMAL
P-R INTERVAL, ECG05: 198 MS
Q-T INTERVAL, ECG07: 410 MS
QRS DURATION, ECG06: 140 MS
QTC CALCULATION (BEZET), ECG08: 472 MS
VENTRICULAR RATE, ECG03: 80 BPM

## 2022-01-27 ENCOUNTER — HOSPITAL ENCOUNTER (EMERGENCY)
Age: 87
Discharge: HOME | End: 2022-01-27
Attending: EMERGENCY MEDICINE
Payer: MEDICARE

## 2022-01-27 ENCOUNTER — APPOINTMENT (OUTPATIENT)
Dept: CT IMAGING | Age: 87
End: 2022-01-27
Attending: EMERGENCY MEDICINE
Payer: MEDICARE

## 2022-01-27 VITALS
WEIGHT: 212 LBS | RESPIRATION RATE: 16 BRPM | DIASTOLIC BLOOD PRESSURE: 95 MMHG | SYSTOLIC BLOOD PRESSURE: 156 MMHG | TEMPERATURE: 98.7 F | OXYGEN SATURATION: 97 % | HEART RATE: 83 BPM | HEIGHT: 59 IN | BODY MASS INDEX: 42.74 KG/M2

## 2022-01-27 DIAGNOSIS — S70.01XA CONTUSION OF RIGHT HIP, INITIAL ENCOUNTER: Primary | ICD-10-CM

## 2022-01-27 PROCEDURE — 99283 EMERGENCY DEPT VISIT LOW MDM: CPT

## 2022-01-27 PROCEDURE — 74011000250 HC RX REV CODE- 250: Performed by: EMERGENCY MEDICINE

## 2022-01-27 PROCEDURE — 72192 CT PELVIS W/O DYE: CPT

## 2022-01-27 RX ORDER — NITROFURANTOIN 25; 75 MG/1; MG/1
1 CAPSULE ORAL DAILY
Status: ON HOLD | COMMUNITY
Start: 2022-01-13 | End: 2022-08-03

## 2022-01-27 RX ORDER — LIDOCAINE 4 G/100G
1 PATCH TOPICAL EVERY 24 HOURS
Status: DISCONTINUED | OUTPATIENT
Start: 2022-01-27 | End: 2022-01-27 | Stop reason: HOSPADM

## 2022-01-27 NOTE — ED PROVIDER NOTES
EMERGENCY DEPARTMENT HISTORY AND PHYSICAL EXAM      Date: 1/27/2022  Patient Name: Alonso Garza    History of Presenting Illness     Chief Complaint   Patient presents with    Hip Pain     right hip pain and dragging right leg since a fall x1 week ago. Fell backwards without loc       History Provided By: Patient    HPI: Alonso Garza, 81 y.o. female with PMHx significant for hypothyroid, hypertension, DVT (on Eliquis) presents via EMS to the ED with cc of fall. She reports she had a ground-level fall in her bedroom while putting on her nightgown about a week ago. She fell backwards landing on her right buttock and hip. And back. As the week went on her back pain resolved. However the pain in her right buttock/posterior pelvis area persisted. She has been able to stand and bear weight although painful. She normally ambulates with a cane when she goes out and uses a walker inside her home. She is on Eliquis for a remote lower extremity DVT. No treatment prior to arrival.  She states the pain got worse over the past several days to where she was \"dragging the leg\" due to pain. Pain is a dull ache and worse with movement and weightbearing. PMHx: Significant for thyroid, hypertension, DVT  PSHx: Significant for hysterectomy  Social Hx: Non-smoker. Nondrinker. There are no other complaints, changes, or physical findings at this time. PCP: Nabila Zelaya NP    No current facility-administered medications on file prior to encounter. Current Outpatient Medications on File Prior to Encounter   Medication Sig Dispense Refill    nitrofurantoin, macrocrystal-monohydrate, (MACROBID) 100 mg capsule Take 1 Capsule by mouth daily.  Biotin 2,500 mcg cap Take 2,500 mcg by mouth daily. 1 Capsule 0    mirabegron ER (Myrbetriq) 25 mg ER tablet Take 25 mg by mouth every evening.  Indications: a condition where the urge to urinate results in urine leakage      apixaban (Eliquis) 5 mg tablet Take 5 mg by mouth two (2) times a day. Indications: blood clot in a deep vein of the extremities      vit a,c & e-lutein-minerals (OCUVITE) tablet 1 Tablet two (2) times a day.  dilTIAZem ER (CARDIZEM LA) 240 mg Tb24 tablet Take 240 mg by mouth every evening.  furosemide (LASIX) 20 mg tablet Take 20 mg by mouth daily as needed (swelling in legs).  hydroCHLOROthiazide (HYDRODIURIL) 12.5 mg tablet Take 12.5 mg by mouth daily. Will try to take in morning      levothyroxine (SYNTHROID) 100 mcg tablet Take 100 mcg by mouth Daily (before breakfast).  meclizine (ANTIVERT) 12.5 mg tablet Take 12.5 mg by mouth three (3) times daily as needed (vertigo). Past History     Past Medical History:  Past Medical History:   Diagnosis Date    Arthritis     Ill-defined condition     vertigo    Thromboembolus [facility])        Past Surgical History:  Past Surgical History:   Procedure Laterality Date    HX GYN      hysterectomy       Family History:  History reviewed. No pertinent family history. Social History:  Social History     Tobacco Use    Smoking status: Never Smoker    Smokeless tobacco: Never Used   Substance Use Topics    Alcohol use: Never    Drug use: Never       Allergies: Allergies   Allergen Reactions    Doxycycline Nausea and Vomiting         Review of Systems   Review of Systems   Constitutional: Negative for activity change, chills and fever. HENT: Negative for congestion and sore throat. Eyes: Negative for pain and redness. Respiratory: Negative for cough, chest tightness and shortness of breath. Cardiovascular: Negative for chest pain and palpitations. Gastrointestinal: Negative for abdominal pain, diarrhea, nausea and vomiting. Genitourinary: Negative for dysuria, frequency and urgency. Musculoskeletal: Negative for back pain and neck pain. Skin: Negative for rash. Neurological: Negative for syncope, light-headedness and headaches. Psychiatric/Behavioral: Negative for confusion. All other systems reviewed and are negative. Physical Exam   Physical Exam  Vitals and nursing note reviewed. Constitutional:       General: She is not in acute distress. Appearance: She is well-developed. She is not diaphoretic. HENT:      Head: Normocephalic. Nose: Nose normal.      Mouth/Throat:      Pharynx: No oropharyngeal exudate. Eyes:      General: No scleral icterus. Conjunctiva/sclera: Conjunctivae normal.      Pupils: Pupils are equal, round, and reactive to light. Neck:      Thyroid: No thyromegaly. Vascular: No JVD. Trachea: No tracheal deviation. Cardiovascular:      Rate and Rhythm: Normal rate and regular rhythm. Heart sounds: No murmur heard. No friction rub. No gallop. Pulmonary:      Effort: Pulmonary effort is normal. No respiratory distress. Breath sounds: Normal breath sounds. No stridor. No wheezing or rales. Abdominal:      General: Bowel sounds are normal. There is no distension. Palpations: Abdomen is soft. Tenderness: There is no abdominal tenderness. There is no guarding or rebound. Musculoskeletal:         General: Normal range of motion. Cervical back: Normal range of motion and neck supple. Comments: Moderate pain over right posterior hip and buttock area. Skin intact. No ecchymosis. Able to lift leg off of bed against gravity without difficulty. Toes up/down. Full range of motion of right hip although painful. Lymphadenopathy:      Cervical: No cervical adenopathy. Skin:     General: Skin is warm and dry. Findings: No erythema or rash. Neurological:      Mental Status: She is alert and oriented to person, place, and time. Cranial Nerves: No cranial nerve deficit. Motor: No abnormal muscle tone.       Coordination: Coordination normal.   Psychiatric:         Behavior: Behavior normal.             Diagnostic Study Results     Labs - No results found for this or any previous visit (from the past 12 hour(s)). Radiologic Studies -   CT PELV WO CONT   Final Result   1. No evidence of displaced/nondisplaced fracture or dislocation involving the   right hip. Evidence of minimal degenerative change. Normal contour of the   femoral head. 2. Incidental findings in the pelvic region as described above. CT Results  (Last 48 hours)               01/27/22 0816  CT PELV WO CONT Final result    Impression:  1. No evidence of displaced/nondisplaced fracture or dislocation involving the   right hip. Evidence of minimal degenerative change. Normal contour of the   femoral head. 2. Incidental findings in the pelvic region as described above. Narrative:  CT pelvis without contrast dated 1/27/2022. History is fall one week ago/pain and dragging of the right leg. Spiral, 3-D images through the right hip were obtained without administration of   intravenous contrast material. Dose reduction was achieved through use of a   standardized protocol tailored for this examination and automatic exposure   control for dose modulation. . There is no evidence of acute fracture or   dislocation. There is evidence of very mild degenerative change. The contour of   the femoral head is normal.       Of incidental note is the presence of lower lumbar scoliosis convex to the left. There is evidence of colonic diverticulosis. There is no definite evidence of   diverticulitis. There is \"apparent\" thickening of the wall of the left side of   the urinary bladder (see axial image 57). It is possible that this represents   partial volume averaging. CXR Results  (Last 48 hours)    None            Medical Decision Making   I am the first provider for this patient. I reviewed the vital signs, available nursing notes, past medical history, past surgical history, family history and social history.     Vital Signs-Reviewed the patient's vital signs. Patient Vitals for the past 12 hrs:   Temp Pulse Resp BP SpO2   01/27/22 0759 98.7 °F (37.1 °C) 83 16 (!) 156/95 97 %           Records Reviewed: Nursing notes reviewed    Provider Notes (Medical Decision Making):   DDX: Hip contusion, occult pelvis fracture. 81year-old on Eliquis for prior DVT with ground-level fall a week ago with worsening right hip and buttock pain. Suspect nonoperative pelvic fracture. Will obtain CT pelvis. ED Course:   Initial assessment performed. The patients presenting problems have been discussed, and they are in agreement with the care plan formulated and outlined with them. I have encouraged them to ask questions as they arise throughout their visit. PROGRESS NOTE    Pt reevaluated. CT pelvis without evidence of acute fracture or dislocation. Reassured patient. Discharge home. Advised patient to use her walker. As needed orthopedic follow-up if no improvement. Written by Tequila Lara MD     Progress note:    Pt noted to be feeling better and ready for discharge. Updated pt and/or family on all final lab and/or  imaging findings. Will follow up as instructed. All questions have been answered, pt voiced understanding and agreement with plan. Specific return precautions provided as well as instructions to return to the ED should sx worsen at any time. Vital signs stable for discharge. I have also put together some discharge instructions for them that include: 1) educational information regarding their diagnosis, 2) how to care for their diagnosis at home, as well a 3) list of reasons why they would want to return to the ED prior to their follow-up appointment, should their condition change. Written by Tequila Lara MD        Critical Care Time:   0    Disposition:  Discharge    PLAN:  1. Discharge Medication List as of 1/27/2022  9:18 AM        2.    Follow-up Information     Follow up With Specialties Details Why Contact Izzy Delarosa Day Baton, NP Nurse Practitioner Schedule an appointment as soon as possible for a visit in 1 week  [address] [place] [address]      Jaclyn Tipton MD Oral Surgery Schedule an appointment as soon as possible for a visit in 1 week  [facility] , [address]  [address] [place] [address]  [phone]          Return to ED if worse     Diagnosis     Clinical Impression:   1. Contusion of right hip, initial encounter              Please note that this dictation was completed with [employer], the computer voice recognition software. Quite often unanticipated grammatical, syntax, homophones, and other interpretive errors are inadvertently transcribed by the computer software. Please disregard these errors. Please excuse any errors that have escaped final proofreading.

## 2022-01-27 NOTE — ED NOTES
D/C instructions provided and reviewed with patient. Opportunity provided for discussion. All questions answered. Nothing further at this time.  [facility]

## 2022-03-18 PROBLEM — A41.9 SEPSIS (HCC): Status: ACTIVE | Noted: 2021-10-29

## 2022-03-19 PROBLEM — I87.8 VENOUS STASIS OF BOTH LOWER EXTREMITIES: Status: ACTIVE | Noted: 2020-09-13

## 2022-03-19 PROBLEM — I10 ESSENTIAL HYPERTENSION: Status: ACTIVE | Noted: 2020-09-13

## 2022-03-19 PROBLEM — R32 INCONTINENCE OF URINE IN FEMALE: Status: ACTIVE | Noted: 2020-09-13

## 2022-03-19 PROBLEM — E03.9 ACQUIRED HYPOTHYROIDISM: Status: ACTIVE | Noted: 2020-09-13

## 2022-03-19 PROBLEM — N39.0 COMPLICATED UTI (URINARY TRACT INFECTION): Status: ACTIVE | Noted: 2020-09-12

## 2022-03-20 PROBLEM — I82.513 CHRONIC DEEP VEIN THROMBOSIS (DVT) OF FEMORAL VEIN OF BOTH LOWER EXTREMITIES (HCC): Status: ACTIVE | Noted: 2020-09-13

## 2022-03-20 PROBLEM — N39.0 UTI (URINARY TRACT INFECTION): Status: ACTIVE | Noted: 2020-09-13

## 2022-08-03 ENCOUNTER — HOSPITAL ENCOUNTER (INPATIENT)
Age: 87
LOS: 4 days | Discharge: HOME HEALTH | DRG: 690 | End: 2022-08-07
Attending: EMERGENCY MEDICINE | Admitting: INTERNAL MEDICINE
Payer: MEDICARE

## 2022-08-03 ENCOUNTER — APPOINTMENT (OUTPATIENT)
Dept: GENERAL RADIOLOGY | Age: 87
DRG: 690 | End: 2022-08-03
Attending: EMERGENCY MEDICINE
Payer: MEDICARE

## 2022-08-03 DIAGNOSIS — N30.00 ACUTE CYSTITIS WITHOUT HEMATURIA: Primary | ICD-10-CM

## 2022-08-03 DIAGNOSIS — R50.9 FEVER, UNSPECIFIED FEVER CAUSE: ICD-10-CM

## 2022-08-03 DIAGNOSIS — R53.83 FATIGUE, UNSPECIFIED TYPE: ICD-10-CM

## 2022-08-03 PROBLEM — R53.1 WEAKNESS GENERALIZED: Status: ACTIVE | Noted: 2022-08-03

## 2022-08-03 PROBLEM — R65.10 SIRS (SYSTEMIC INFLAMMATORY RESPONSE SYNDROME) (HCC): Status: ACTIVE | Noted: 2022-08-03

## 2022-08-03 LAB
ALBUMIN SERPL-MCNC: 3.4 G/DL (ref 3.5–5)
ALBUMIN/GLOB SERPL: 0.9 {RATIO} (ref 1.1–2.2)
ALP SERPL-CCNC: 129 U/L (ref 45–117)
ALT SERPL-CCNC: 29 U/L (ref 12–78)
ANION GAP SERPL CALC-SCNC: 8 MMOL/L (ref 5–15)
APPEARANCE UR: CLEAR
AST SERPL-CCNC: 19 U/L (ref 15–37)
BACTERIA URNS QL MICRO: ABNORMAL /HPF
BASOPHILS # BLD: 0 K/UL (ref 0–0.1)
BASOPHILS NFR BLD: 0 % (ref 0–1)
BILIRUB SERPL-MCNC: 0.7 MG/DL (ref 0.2–1)
BILIRUB UR QL: NEGATIVE
BUN SERPL-MCNC: 16 MG/DL (ref 6–20)
BUN/CREAT SERPL: 11 (ref 12–20)
CALCIUM SERPL-MCNC: 9.3 MG/DL (ref 8.5–10.1)
CHLORIDE SERPL-SCNC: 102 MMOL/L (ref 97–108)
CO2 SERPL-SCNC: 29 MMOL/L (ref 21–32)
COLOR UR: ABNORMAL
CREAT SERPL-MCNC: 1.46 MG/DL (ref 0.55–1.02)
DIFFERENTIAL METHOD BLD: ABNORMAL
EOSINOPHIL # BLD: 0 K/UL (ref 0–0.4)
EOSINOPHIL NFR BLD: 0 % (ref 0–7)
EPITH CASTS URNS QL MICRO: ABNORMAL /LPF
ERYTHROCYTE [DISTWIDTH] IN BLOOD BY AUTOMATED COUNT: 14.3 % (ref 11.5–14.5)
FLUAV RNA SPEC QL NAA+PROBE: NOT DETECTED
FLUBV RNA SPEC QL NAA+PROBE: NOT DETECTED
GLOBULIN SER CALC-MCNC: 4 G/DL (ref 2–4)
GLUCOSE SERPL-MCNC: 126 MG/DL (ref 65–100)
GLUCOSE UR STRIP.AUTO-MCNC: NEGATIVE MG/DL
HCT VFR BLD AUTO: 38.9 % (ref 35–47)
HGB BLD-MCNC: 12.6 G/DL (ref 11.5–16)
HGB UR QL STRIP: ABNORMAL
IMM GRANULOCYTES # BLD AUTO: 0 K/UL (ref 0–0.04)
IMM GRANULOCYTES NFR BLD AUTO: 0 % (ref 0–0.5)
KETONES UR QL STRIP.AUTO: NEGATIVE MG/DL
LACTATE SERPL-SCNC: 1.9 MMOL/L (ref 0.4–2)
LEUKOCYTE ESTERASE UR QL STRIP.AUTO: NEGATIVE
LYMPHOCYTES # BLD: 0.9 K/UL (ref 0.8–3.5)
LYMPHOCYTES NFR BLD: 9 % (ref 12–49)
MCH RBC QN AUTO: 29.4 PG (ref 26–34)
MCHC RBC AUTO-ENTMCNC: 32.4 G/DL (ref 30–36.5)
MCV RBC AUTO: 90.7 FL (ref 80–99)
MONOCYTES # BLD: 0.7 K/UL (ref 0–1)
MONOCYTES NFR BLD: 7 % (ref 5–13)
NEUTS SEG # BLD: 8.6 K/UL (ref 1.8–8)
NEUTS SEG NFR BLD: 84 % (ref 32–75)
NITRITE UR QL STRIP.AUTO: NEGATIVE
NRBC # BLD: 0 K/UL (ref 0–0.01)
NRBC BLD-RTO: 0 PER 100 WBC
PH UR STRIP: 7.5 [PH] (ref 5–8)
PLATELET # BLD AUTO: 183 K/UL (ref 150–400)
PMV BLD AUTO: 8.2 FL (ref 8.9–12.9)
POTASSIUM SERPL-SCNC: 3.9 MMOL/L (ref 3.5–5.1)
PROT SERPL-MCNC: 7.4 G/DL (ref 6.4–8.2)
PROT UR STRIP-MCNC: ABNORMAL MG/DL
RBC # BLD AUTO: 4.29 M/UL (ref 3.8–5.2)
RBC #/AREA URNS HPF: ABNORMAL /HPF (ref 0–5)
SARS-COV-2, COV2: NOT DETECTED
SODIUM SERPL-SCNC: 139 MMOL/L (ref 136–145)
SP GR UR REFRACTOMETRY: 1.01 (ref 1–1.03)
TROPONIN-HIGH SENSITIVITY: 14 NG/L (ref 0–51)
UA: UC IF INDICATED,UAUC: ABNORMAL
UROBILINOGEN UR QL STRIP.AUTO: 0.2 EU/DL (ref 0.2–1)
WBC # BLD AUTO: 10.3 K/UL (ref 3.6–11)
WBC URNS QL MICRO: ABNORMAL /HPF (ref 0–4)

## 2022-08-03 PROCEDURE — 71045 X-RAY EXAM CHEST 1 VIEW: CPT

## 2022-08-03 PROCEDURE — 65270000029 HC RM PRIVATE

## 2022-08-03 PROCEDURE — 74011250637 HC RX REV CODE- 250/637: Performed by: EMERGENCY MEDICINE

## 2022-08-03 PROCEDURE — 80053 COMPREHEN METABOLIC PANEL: CPT

## 2022-08-03 PROCEDURE — 85025 COMPLETE CBC W/AUTO DIFF WBC: CPT

## 2022-08-03 PROCEDURE — 87086 URINE CULTURE/COLONY COUNT: CPT

## 2022-08-03 PROCEDURE — 83605 ASSAY OF LACTIC ACID: CPT

## 2022-08-03 PROCEDURE — 87040 BLOOD CULTURE FOR BACTERIA: CPT

## 2022-08-03 PROCEDURE — 74011000250 HC RX REV CODE- 250: Performed by: INTERNAL MEDICINE

## 2022-08-03 PROCEDURE — 84484 ASSAY OF TROPONIN QUANT: CPT

## 2022-08-03 PROCEDURE — 36415 COLL VENOUS BLD VENIPUNCTURE: CPT

## 2022-08-03 PROCEDURE — 81001 URINALYSIS AUTO W/SCOPE: CPT

## 2022-08-03 PROCEDURE — 74011000258 HC RX REV CODE- 258: Performed by: EMERGENCY MEDICINE

## 2022-08-03 PROCEDURE — 77030019903 HC CATH URET INT BARD -A

## 2022-08-03 PROCEDURE — 93005 ELECTROCARDIOGRAM TRACING: CPT

## 2022-08-03 PROCEDURE — 87636 SARSCOV2 & INF A&B AMP PRB: CPT

## 2022-08-03 PROCEDURE — 74011250636 HC RX REV CODE- 250/636: Performed by: EMERGENCY MEDICINE

## 2022-08-03 PROCEDURE — 99285 EMERGENCY DEPT VISIT HI MDM: CPT

## 2022-08-03 PROCEDURE — 74011250637 HC RX REV CODE- 250/637: Performed by: INTERNAL MEDICINE

## 2022-08-03 RX ORDER — LEVOTHYROXINE SODIUM 100 UG/1
100 TABLET ORAL
Status: DISCONTINUED | OUTPATIENT
Start: 2022-08-04 | End: 2022-08-07 | Stop reason: HOSPADM

## 2022-08-03 RX ORDER — ACETAMINOPHEN 325 MG/1
650 TABLET ORAL
Status: DISCONTINUED | OUTPATIENT
Start: 2022-08-03 | End: 2022-08-07 | Stop reason: HOSPADM

## 2022-08-03 RX ORDER — FAMOTIDINE 20 MG/1
20 TABLET, FILM COATED ORAL EVERY EVENING
Status: DISCONTINUED | OUTPATIENT
Start: 2022-08-03 | End: 2022-08-07 | Stop reason: HOSPADM

## 2022-08-03 RX ORDER — OXYBUTYNIN CHLORIDE 5 MG/1
5 TABLET ORAL 2 TIMES DAILY
Status: ON HOLD | COMMUNITY
End: 2022-08-03

## 2022-08-03 RX ORDER — DEXTROMETHORPHAN HYDROBROMIDE, GUAIFENESIN 5; 100 MG/5ML; MG/5ML
650 LIQUID ORAL
COMMUNITY

## 2022-08-03 RX ORDER — SULFAMETHOXAZOLE AND TRIMETHOPRIM 400; 80 MG/1; MG/1
TABLET ORAL
COMMUNITY
Start: 2022-07-29 | End: 2022-08-07

## 2022-08-03 RX ORDER — NYSTATIN 100000 [USP'U]/G
POWDER TOPICAL
COMMUNITY
Start: 2021-09-21

## 2022-08-03 RX ORDER — SODIUM CHLORIDE 0.9 % (FLUSH) 0.9 %
5-40 SYRINGE (ML) INJECTION AS NEEDED
Status: DISCONTINUED | OUTPATIENT
Start: 2022-08-03 | End: 2022-08-07 | Stop reason: HOSPADM

## 2022-08-03 RX ORDER — TROSPIUM CHLORIDE 20 MG/1
20 TABLET, FILM COATED ORAL DAILY
Status: DISCONTINUED | OUTPATIENT
Start: 2022-08-04 | End: 2022-08-07 | Stop reason: HOSPADM

## 2022-08-03 RX ORDER — SODIUM CHLORIDE 0.9 % (FLUSH) 0.9 %
5-40 SYRINGE (ML) INJECTION EVERY 8 HOURS
Status: DISCONTINUED | OUTPATIENT
Start: 2022-08-03 | End: 2022-08-07 | Stop reason: HOSPADM

## 2022-08-03 RX ORDER — ACETAMINOPHEN 500 MG
1000 TABLET ORAL ONCE
Status: COMPLETED | OUTPATIENT
Start: 2022-08-03 | End: 2022-08-03

## 2022-08-03 RX ORDER — LORATADINE 10 MG/1
10 TABLET ORAL DAILY
Status: ON HOLD | COMMUNITY
End: 2022-08-03

## 2022-08-03 RX ORDER — POLYETHYLENE GLYCOL 3350 17 G/17G
17 POWDER, FOR SOLUTION ORAL DAILY PRN
Status: DISCONTINUED | OUTPATIENT
Start: 2022-08-03 | End: 2022-08-07 | Stop reason: HOSPADM

## 2022-08-03 RX ORDER — ONDANSETRON 2 MG/ML
4 INJECTION INTRAMUSCULAR; INTRAVENOUS
Status: DISCONTINUED | OUTPATIENT
Start: 2022-08-03 | End: 2022-08-07 | Stop reason: HOSPADM

## 2022-08-03 RX ORDER — SODIUM CHLORIDE 0.9 % (FLUSH) 0.9 %
5-10 SYRINGE (ML) INJECTION AS NEEDED
Status: DISCONTINUED | OUTPATIENT
Start: 2022-08-03 | End: 2022-08-07 | Stop reason: HOSPADM

## 2022-08-03 RX ORDER — DILTIAZEM HYDROCHLORIDE 240 MG/1
240 CAPSULE, COATED, EXTENDED RELEASE ORAL DAILY
Status: DISCONTINUED | OUTPATIENT
Start: 2022-08-03 | End: 2022-08-07 | Stop reason: HOSPADM

## 2022-08-03 RX ORDER — FUROSEMIDE 20 MG/1
20 TABLET ORAL
Status: DISCONTINUED | OUTPATIENT
Start: 2022-08-03 | End: 2022-08-07 | Stop reason: HOSPADM

## 2022-08-03 RX ADMIN — ACETAMINOPHEN 650 MG: 325 TABLET ORAL at 14:47

## 2022-08-03 RX ADMIN — SODIUM CHLORIDE, PRESERVATIVE FREE 10 ML: 5 INJECTION INTRAVENOUS at 14:00

## 2022-08-03 RX ADMIN — FAMOTIDINE 20 MG: 20 TABLET, FILM COATED ORAL at 17:41

## 2022-08-03 RX ADMIN — ACETAMINOPHEN 1000 MG: 500 TABLET ORAL at 08:15

## 2022-08-03 RX ADMIN — SODIUM CHLORIDE 500 ML: 9 INJECTION, SOLUTION INTRAVENOUS at 07:56

## 2022-08-03 RX ADMIN — I-VITE, TAB 1000-60-2MG (60/BT) 1 TABLET: TAB at 12:53

## 2022-08-03 RX ADMIN — APIXABAN 5 MG: 5 TABLET, FILM COATED ORAL at 12:53

## 2022-08-03 RX ADMIN — APIXABAN 5 MG: 5 TABLET, FILM COATED ORAL at 17:40

## 2022-08-03 RX ADMIN — I-VITE, TAB 1000-60-2MG (60/BT) 1 TABLET: TAB at 17:40

## 2022-08-03 RX ADMIN — CEFTRIAXONE SODIUM 1 G: 1 INJECTION, POWDER, FOR SOLUTION INTRAMUSCULAR; INTRAVENOUS at 09:15

## 2022-08-03 RX ADMIN — SODIUM CHLORIDE, PRESERVATIVE FREE 10 ML: 5 INJECTION INTRAVENOUS at 21:35

## 2022-08-03 RX ADMIN — DILTIAZEM HYDROCHLORIDE 240 MG: 240 CAPSULE, COATED, EXTENDED RELEASE ORAL at 12:53

## 2022-08-03 NOTE — PROGRESS NOTES
Pharmacy Medication Reconciliation     The patient was interviewed regarding current PTA medication list, use and drug allergies. Clarified PTA med list with patient. The patient was questioned regarding use of any:  inhalers, topical products, over the counter medications, herbal medications, vitamin products or ophthalmic/nasal/otic medication use. Allergies were verified. Allergy Update: Doxycycline    Recommendations/Findings: The following amendments were made to the patient's active medication list on file at [facility]:   1) Additions: none    2) Deletions: macrobid, oxybutynin, loratidine, lidoderm, HCTZ    3) Changes: none    Counseling provided includes side effects/adverse drug reaction: yes    Drug Interactions and duplicate therapies include: n/a    PTA medication list was corrected to the following:     Prior to Admission Medications   Prescriptions Last Dose Informant Patient Reported? Taking? Biotin 2,500 mcg cap 8/2/2022  No Yes   Sig: Take 2,500 mcg by mouth daily. acetaminophen (TYLENOL) 650 mg TbER 8/2/2022 at n  Yes Yes   Sig: Take 650 mg by mouth every eight (8) hours as needed. apixaban (ELIQUIS) 5 mg tablet 8/2/2022 at 2100  Yes Yes   Sig: Take 5 mg by mouth two (2) times a day. Indications: blood clot in a deep vein of the extremities   dilTIAZem ER (CARDIZEM LA) 240 mg Tb24 tablet 8/2/2022 at 2100  Yes Yes   Sig: Take 240 mg by mouth every evening. furosemide (LASIX) 20 mg tablet 8/2/2022 at 2100  Yes Yes   Sig: Take 20 mg by mouth daily as needed (swelling in legs). levothyroxine (SYNTHROID) 100 mcg tablet 8/2/2022 at 0700  Yes Yes   Sig: Take 100 mcg by mouth Daily (before breakfast). meclizine (ANTIVERT) 12.5 mg tablet Unknown  Yes No   Sig: Take 12.5 mg by mouth three (3) times daily as needed (vertigo). mirabegron ER (MYRBETRIQ) 25 mg ER tablet 8/2/2022 at 2100  Yes Yes   Sig: Take 25 mg by mouth every evening.  Indications: a condition where the urge to urinate results in urine leakage   nystatin (MYCOSTATIN) powder 2022 at 1200  Yes Yes   Sig: Apply  to affected area. trimethoprim-sulfamethoxazole (BACTRIM, SEPTRA)  mg per tablet 2022 at 2100  Yes Yes   vit a,c & e-lutein-minerals (OCUVITE) tablet 2022 at 1200  Yes Yes   Si Tablet two (2) times a day.       Facility-Administered Medications: None        Thank you,  Elias Rockwell, PHARMD

## 2022-08-03 NOTE — PROGRESS NOTES
Occupational Therapy  OT consult received, chart reviewed. Initiated OT evaluation, but patient declines, indicating that she isn't feeling well enough right now. Discussed patient with RN who indicates that she still has a fever. Will defer for now and follow up tomorrow.

## 2022-08-03 NOTE — PROGRESS NOTES
TRANSFER - IN REPORT:    Verbal report received from [facility] on [id number] [facility]  being received from ER  for routine progression of care      Report consisted of patients Situation, Background, Assessment and   Recommendations(SBAR). Information from the following report(s) ED Summary was reviewed with the receiving nurse. Opportunity for questions and clarification was provided. Assessment completed upon patients arrival to unit and care assumed.

## 2022-08-03 NOTE — ED PROVIDER NOTES
EMERGENCY DEPARTMENT HISTORY AND PHYSICAL EXAM      Date: 8/3/2022  Patient Name: Calixto Odonnell    History of Presenting Illness     Chief Complaint   Patient presents with    Fatigue       History Provided By: Patient    HPI: Calixto Odonnell, 81 y.o. female with PMHx significant for hypertension, recurrent UTIs, osteoarthritis chronic back pain, presents via EMS to the ED with cc of generalized weakness. She is on chronic Macrobid for recurrent UTI prophylaxis. She reports on July 29 [facility] did not have her Macrobid available and she was switched to a different agent by her PCPs office, Bactrim. She states she does not like this medication at all. She states she has been very shaky and fatigued over the past several days. Today she felt more weak. She was able to get out of the bed to go to the bathroom but was unable to get out of the bathroom. She has a home health aide that comes by for about 4 hours a day. Home health aide had a very difficult time getting her out of the bathroom. EMS was called. She states she normally ambulates with a cane or a walker. She lives by herself in [facility]. She denies any chest pain or shortness of breath. She denies any abdominal pain, nausea vomiting or diarrhea. No rash. She denies any fevers but reports chills. She reports generalized weakness. She reports chronic pain in her right knee and chronic bilateral leg edema that is mostly at baseline. 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 PSHx: Significant for hysterectomy  Social Hx: Non-smoker. No alcohol or illicit drug use. There are no other complaints, changes, or physical findings at this time. PCP: Nyasia Marroquin NP    No current facility-administered medications on file prior to encounter.      Current Outpatient Medications on File Prior to Encounter   Medication Sig Dispense Refill    nystatin (MYCOSTATIN) powder Apply  to affected area. acetaminophen (TYLENOL) 650 mg TbER Take 650 mg by mouth every eight (8) hours as needed. loratadine (CLARITIN) 10 mg tablet Take 10 mg by mouth in the morning. trimethoprim-sulfamethoxazole (BACTRIM, SEPTRA)  mg per tablet       oxybutynin (DITROPAN) 5 mg tablet Take 5 mg by mouth two (2) times a day. mirabegron ER (MYRBETRIQ) 25 mg ER tablet Take 25 mg by mouth every evening. Indications: a condition where the urge to urinate results in urine leakage      apixaban (ELIQUIS) 5 mg tablet Take 5 mg by mouth two (2) times a day. Indications: blood clot in a deep vein of the extremities      vit a,c & e-lutein-minerals (OCUVITE) tablet 1 Tablet two (2) times a day. dilTIAZem ER (CARDIZEM LA) 240 mg Tb24 tablet Take 240 mg by mouth every evening. hydroCHLOROthiazide (HYDRODIURIL) 12.5 mg tablet Take 12.5 mg by mouth daily. Will try to take in morning      levothyroxine (SYNTHROID) 100 mcg tablet Take 100 mcg by mouth Daily (before breakfast). nitrofurantoin, macrocrystal-monohydrate, (MACROBID) 100 mg capsule Take 1 Capsule by mouth daily. (Patient not taking: Reported on 8/3/2022)      lidocaine HCL 4 % ptmd 1 Patch by Apply Externally route daily as needed for Pain. On for 12 hours and off for 12 hours (Patient not taking: Reported on 8/3/2022) 30 Each 0    Biotin 2,500 mcg cap Take 2,500 mcg by mouth daily. 1 Capsule 0    furosemide (LASIX) 20 mg tablet Take 20 mg by mouth daily as needed (swelling in legs). meclizine (ANTIVERT) 12.5 mg tablet Take 12.5 mg by mouth three (3) times daily as needed (vertigo). Past History     Past Medical History:  Past Medical History:   Diagnosis Date    Arthritis     Ill-defined condition     vertigo    Thromboembolus ([address]     Urinary bladder incontinence        Past Surgical History:  Past Surgical History:   Procedure Laterality Date    HX GYN      hysterectomy       Family History:  History reviewed.  No pertinent family history. Social History:  Social History     Tobacco Use    Smoking status: Never    Smokeless tobacco: Never   Substance Use Topics    Alcohol use: Never    Drug use: Never       Allergies: Allergies   Allergen Reactions    Doxycycline Nausea and Vomiting         Review of Systems   Review of Systems   Constitutional:  Positive for fatigue. Negative for activity change, chills and fever. HENT:  Negative for congestion and sore throat. Eyes:  Negative for pain and redness. Respiratory:  Negative for cough, chest tightness and shortness of breath. Cardiovascular:  Negative for chest pain and palpitations. Gastrointestinal:  Negative for abdominal pain, diarrhea, nausea and vomiting. Genitourinary:  Negative for dysuria, frequency and urgency. Musculoskeletal:  Positive for arthralgias. Negative for back pain and neck pain. Skin:  Negative for rash. Neurological:  Negative for syncope, light-headedness and headaches. Psychiatric/Behavioral:  Negative for confusion. All other systems reviewed and are negative. Physical Exam   Physical Exam  Vitals and nursing note reviewed. Constitutional:       General: She is not in acute distress. Appearance: She is well-developed. She is not diaphoretic. HENT:      Head: Normocephalic. Nose: Nose normal.      Mouth/Throat:      Pharynx: No oropharyngeal exudate. Eyes:      General: No scleral icterus. Conjunctiva/sclera: Conjunctivae normal.      Pupils: Pupils are equal, round, and reactive to light. Neck:      Thyroid: No thyromegaly. Vascular: No JVD. Trachea: No tracheal deviation. Cardiovascular:      Rate and Rhythm: Normal rate and regular rhythm. Heart sounds: No murmur heard. No friction rub. No gallop. Pulmonary:      Effort: Pulmonary effort is normal. No respiratory distress. Breath sounds: Normal breath sounds. No stridor. No wheezing or rales.    Abdominal:      General: Bowel sounds are normal. There is no distension. Palpations: Abdomen is soft. Tenderness: There is no abdominal tenderness. There is no guarding or rebound. Musculoskeletal:         General: Normal range of motion. Cervical back: Normal range of motion and neck supple. Lymphadenopathy:      Cervical: No cervical adenopathy. Skin:     General: Skin is warm and dry. Findings: No erythema or rash. Neurological:      Mental Status: She is alert and oriented to person, place, and time. Cranial Nerves: No cranial nerve deficit. Motor: No abnormal muscle tone.       Coordination: Coordination normal.   Psychiatric:         Behavior: Behavior normal.         Diagnostic Study Results     Labs -     Recent Results (from the past 12 hour(s))   EKG, 12 LEAD, INITIAL    Collection Time: 08/03/22  7:46 AM   Result Value Ref Range    Ventricular Rate 91 BPM    Atrial Rate 91 BPM    P-R Interval 208 ms    QRS Duration 128 ms    Q-T Interval 384 ms    QTC Calculation (Bezet) 472 ms    Calculated P Axis 63 degrees    Calculated R Axis -76 degrees    Calculated T Axis 55 degrees    Diagnosis       Normal sinus rhythm  Left axis deviation  Left bundle branch block  Abnormal ECG  When compared with ECG of 29-OCT-2021 19:28,  No significant change was found     LACTIC ACID    Collection Time: 08/03/22  7:59 AM   Result Value Ref Range    Lactic acid 1.9 0.4 - 2.0 MMOL/L   METABOLIC PANEL, COMPREHENSIVE    Collection Time: 08/03/22  7:59 AM   Result Value Ref Range    Sodium 139 136 - 145 mmol/L    Potassium 3.9 3.5 - 5.1 mmol/L    Chloride 102 97 - 108 mmol/L    CO2 29 21 - 32 mmol/L    Anion gap 8 5 - 15 mmol/L    Glucose 126 (H) 65 - 100 mg/dL    BUN 16 6 - 20 MG/DL    Creatinine 1.46 (H) 0.55 - 1.02 MG/DL    BUN/Creatinine ratio 11 (L) 12 - 20      GFR est AA 41 (L) >60 ml/min/1.73m2    GFR est non-AA 34 (L) >60 ml/min/1.73m2    Calcium 9.3 8.5 - 10.1 MG/DL    Bilirubin, total 0.7 0.2 - 1.0 MG/DL ALT (SGPT) 29 12 - 78 U/L    AST (SGOT) 19 15 - 37 U/L    Alk. phosphatase 129 (H) 45 - 117 U/L    Protein, total 7.4 6.4 - 8.2 g/dL    Albumin 3.4 (L) 3.5 - 5.0 g/dL    Globulin 4.0 2.0 - 4.0 g/dL    A-G Ratio 0.9 (L) 1.1 - 2.2     CBC WITH AUTOMATED DIFF    Collection Time: 08/03/22  7:59 AM   Result Value Ref Range    WBC 10.3 3.6 - 11.0 K/uL    RBC 4.29 3.80 - 5.20 M/uL    HGB 12.6 11.5 - 16.0 g/dL    HCT 38.9 35.0 - 47.0 %    MCV 90.7 80.0 - 99.0 FL    MCH 29.4 26.0 - 34.0 PG    MCHC 32.4 30.0 - 36.5 g/dL    RDW 14.3 11.5 - 14.5 %    PLATELET 496 380 - 178 K/uL    MPV 8.2 (L) 8.9 - 12.9 FL    NRBC 0.0 0  WBC    ABSOLUTE NRBC 0.00 0.00 - 0.01 K/uL    NEUTROPHILS 84 (H) 32 - 75 %    LYMPHOCYTES 9 (L) 12 - 49 %    MONOCYTES 7 5 - 13 %    EOSINOPHILS 0 0 - 7 %    BASOPHILS 0 0 - 1 %    IMMATURE GRANULOCYTES 0 0.0 - 0.5 %    ABS. NEUTROPHILS 8.6 (H) 1.8 - 8.0 K/UL    ABS. LYMPHOCYTES 0.9 0.8 - 3.5 K/UL    ABS. MONOCYTES 0.7 0.0 - 1.0 K/UL    ABS. EOSINOPHILS 0.0 0.0 - 0.4 K/UL    ABS. BASOPHILS 0.0 0.0 - 0.1 K/UL    ABS. IMM.  GRANS. 0.0 0.00 - 0.04 K/UL    DF AUTOMATED     TROPONIN-HIGH SENSITIVITY    Collection Time: 08/03/22  7:59 AM   Result Value Ref Range    Troponin-High Sensitivity 14 0 - 51 ng/L   URINALYSIS W/ REFLEX CULTURE    Collection Time: 08/03/22  8:05 AM    Specimen: Urine   Result Value Ref Range    Color YELLOW/STRAW      Appearance CLEAR CLEAR      Specific gravity 1.010 1.003 - 1.030      pH (UA) 7.5 5.0 - 8.0      Protein TRACE (A) NEG mg/dL    Glucose Negative NEG mg/dL    Ketone Negative NEG mg/dL    Bilirubin Negative NEG      Blood TRACE (A) NEG      Urobilinogen 0.2 0.2 - 1.0 EU/dL    Nitrites Negative NEG      Leukocyte Esterase Negative NEG      WBC 0-4 0 - 4 /hpf    RBC 5-10 0 - 5 /hpf    Epithelial cells FEW FEW /lpf    Bacteria 1+ (A) NEG /hpf    UA:UC IF INDICATED CULTURE NOT INDICATED BY UA RESULT CNI     COVID-19 WITH INFLUENZA A/B    Collection Time: 08/03/22  8:46 AM Result Value Ref Range    SARS-CoV-2 by PCR Not detected NOTD      Influenza A by PCR Not detected NOTD      Influenza B by PCR Not detected NOTD         Radiologic Studies -   XR CHEST PORT   Final Result   No acute abnormality              CT Results  (Last 48 hours)      None          CXR Results  (Last 48 hours)                 08/03/22 0816  XR CHEST PORT Final result    Impression:  No acute abnormality               Narrative:  EXAM:  XR CHEST PORT       INDICATION:  Eval for Infiltrate       COMPARISON:  10/29/2021       FINDINGS: A portable AP radiograph of the chest was obtained at the 12 hours. The patient is on a cardiac monitor. The lungs are clear. Cardiac silhouette   is stable. .  Bony structures are unchanged. Medical Decision Making   I am the first provider for this patient. I reviewed the vital signs, available nursing notes, past medical history, past surgical history, family history and social history. Vital Signs-Reviewed the patient's vital signs. Patient Vitals for the past 12 hrs:   Temp Pulse Resp BP SpO2   08/03/22 0935 -- 75 25 (!) 133/45 98 %   08/03/22 0919 98.8 °F (37.1 °C) -- -- -- --   08/03/22 0907 -- 82 21 (!) 133/54 96 %   08/03/22 0905 -- 78 20 -- 94 %   08/03/22 0852 -- 74 17 (!) 122/46 95 %   08/03/22 0841 -- 75 24 -- 96 %   08/03/22 0835 -- 80 26 -- 96 %   08/03/22 0827 -- 79 24 -- 99 %   08/03/22 0826 -- 81 (!) 34 -- 99 %   08/03/22 0812 -- 88 29 (!) 132/40 99 %   08/03/22 0811 -- 92 21 -- 99 %   08/03/22 0805 -- (!) 112 (!) 32 -- 97 %   08/03/22 0756 -- 85 26 -- 95 %   08/03/22 0741 -- 93 18 -- 94 %   08/03/22 0735 -- 86 13 -- 97 %   08/03/22 0729 (!) 101.2 °F (38.4 °C) 92 18 (!) 140/67 95 %   08/03/22 0727 -- -- -- (!) 141/67 --       Pulse Oximetry Analysis - 95% on RA    Cardiac Monitor:   Rate: 92 bpm  Rhythm: Normal Sinus Rhythm        ED EKG interpretation: 7:46 AM  Rhythm: normal sinus rhythm; and regular . Rate (approx.): 91;  Axis: left axis deviation; PA Interval: normal; QRS interval: Left bundle branch block; ST/T wave: normal; This EKG was interpreted by Telma Rao MD,ED Provider. Records Reviewed: Nursing Notes, Old Medical Records, and Ambulance Run Sheet    Provider Notes (Medical Decision Making):   DDx: UTI, sepsis, electrolyte abnormality    52-year-old female with generalized weakness. History of recurrent UTIs on prophylaxis with her bed normally. Macrobid unavailable at pharmacy on July 29. She was switched to Bactrim. Progressively worsening generalized weakness and fatigue since starting the Bactrim. Normally ambulatory with cane or walker. She was able to ambulate to the bathroom but unable to ambulate out this morning. EMS was called. She is febrile to 101. 2. Normal BPs and heart rate. Well-appearing. Abdomen benign. Will perform sepsis work-up. She is already on Lasix for chronic lower extremity edema and fluid retention. Voiding aggressive fluid resuscitation at this point. Started with 500 cc bolus. ED Course:   Initial assessment performed. The patients presenting problems have been discussed, and they are in agreement with the care plan formulated and outlined with them. I have encouraged them to ask questions as they arise throughout their visit. ED Course as of 08/03/22 0956   Wed Aug 03, 2022   0911 Still to the hospitalist Dr. Carolina Gamboa for possible admission. I reviewed patient's history, presentation and lab and imaging results with him. Patient with UTI. No elevated WBC or lactate. Patient with generalized weakness and not able to ambulate. On chronic UTI prophylaxis with Macrobid and recently Bactrim as Macrobid was unavailable. Now with fever and UTI. Gave IV ceftriaxone. Vital signs stable. He recommended I place quick admit orders. As Patient lives alone she may require brief rehab stay.  [CH]      ED Course User Index  [CH] Johny Turk MD         PROGRESS NOTE    Pt reevaluated. Will admit for UTI   Written by Yen Wen MD             Critical Care Time:   0    Disposition:  Admit    PLAN:  1. Current Discharge Medication List        2. Follow-up Information    None       Return to ED if worse     Diagnosis     Clinical Impression:   1. Acute cystitis without hematuria    2. Fatigue, unspecified type    3. Fever, unspecified fever cause              Please note that this dictation was completed with Pact, the computer voice recognition software. Quite often unanticipated grammatical, syntax, homophones, and other interpretive errors are inadvertently transcribed by the computer software. Please disregard these errors. Please excuse any errors that have escaped final proofreading.

## 2022-08-03 NOTE — ED NOTES
TRANSFER - OUT REPORT:    Verbal report given to [facility] RN(name) on Galen Blount  being transferred to room 116(unit) for routine progression of care       Report consisted of patients Situation, Background, Assessment and   Recommendations(SBAR). Information from the following report(s) SBAR, Kardex and ED Summary was reviewed with the receiving nurse. Lines:   Peripheral IV 08/03/22 Left Arm (Active)   Site Assessment Clean, dry, & intact 08/03/22 0737   Phlebitis Assessment 0 08/03/22 0737   Infiltration Assessment 0 08/03/22 0737   Dressing Status Clean, dry, & intact 08/03/22 0737   Dressing Type Transparent 08/03/22 0737   Hub Color/Line Status Pink 08/03/22 0737   Alcohol Cap Used No 08/03/22 0737        Opportunity for questions and clarification was provided.       Patient transported with:   Sent with RN via stretcher

## 2022-08-03 NOTE — PROGRESS NOTES
Problem: Hypertension  Goal: *Fluid volume balance  Outcome: Progressing Towards Goal  Goal: *Labs within defined limits  Outcome: Progressing Towards Goal     Problem: Patient Education: Go to Patient Education Activity  Goal: Patient/Family Education  Outcome: Progressing Towards Goal     Problem: Falls - Risk of  Goal: *Absence of Falls  Description: Document Annie Blood Fall Risk and appropriate interventions in the flowsheet.   Outcome: Progressing Towards Goal  Note: Fall Risk Interventions:  Mobility Interventions: Patient to call before getting OOB, Bed/chair exit alarm         Medication Interventions: Bed/chair exit alarm, Teach patient to arise slowly, Patient to call before getting OOB    Elimination Interventions: Call light in reach, Bed/chair exit alarm              Problem: Patient Education: Go to Patient Education Activity  Goal: Patient/Family Education  Outcome: Progressing Towards Goal     Problem: Pain  Goal: *Control of Pain  Outcome: Progressing Towards Goal  Goal: *PALLIATIVE CARE:  Alleviation of Pain  Outcome: Progressing Towards Goal     Problem: General Medical Care Plan  Goal: *Vital signs within specified parameters  Outcome: Progressing Towards Goal  Goal: *Labs within defined limits  Outcome: Progressing Towards Goal  Goal: *Absence of infection signs and symptoms  Outcome: Progressing Towards Goal  Goal: *Optimal pain control at patient's stated goal  Outcome: Progressing Towards Goal  Goal: *Fluid volume balance  Outcome: Progressing Towards Goal  Goal: *Progressive mobility and function (eg: ADL's)  Outcome: Progressing Towards Goal     Problem: Patient Education: Go to Patient Education Activity  Goal: Patient/Family Education  Outcome: Progressing Towards Goal     Problem: Discharge Planning  Goal: *Discharge to safe environment  Outcome: Progressing Towards Goal

## 2022-08-03 NOTE — ED NOTES
Pt pain assessed. Offered use of restroom and assistance as necessary. Pt has [address] in place. Pt offered repositioning in bed for comfort. Assessed pt's ability to access possessions, everything within reach of pt. Pt reminded to use call bell as necessary, report any changes in status. Pt thanked for allowing care. Bed locked in lowest position, side rails up as necessary.

## 2022-08-03 NOTE — H&P
[facility]   Admission History & Physical        8/3/2022 11:18 AM  Patient: Meenu Carpio 1935  PCP: Lakeisha Montes NP    HISTORY  Chief Complaint:   Chief Complaint   Patient presents with    Fatigue       HPI: 81 y.o. female presenting for admission to [facility] for further evaluation and treatment for Complicated UTI (urinary tract infection). She  has a past medical history of Arthritis, Ill-defined condition, Thromboembolus ([address] and Urinary bladder incontinence. . She presents early this morning to the ED with complaints of generalized weakness. She has a history of stage III chronic kidney disease and history for recurrent UTIs and has done well taking suppressive therapy with Macrobid 100 mg daily. Local pharmacy could not refill her nitrofurantoin on Friday, July 29 and substituted Bactrim. Has done poorly that since that time with symptoms of tremor and weakness. She was not aware of an elevated temperature. Today she felt even weaker than prior and presented to the ED for assessment. Mated into the bathroom this morning but was unable to get herself back to bed.'s morning the home health aide presented at her scheduled time and was not able to get her out of the bathroom. EMS was called and she was brought to the ED. She normally ambulates independently with a cane or walker. No complaint for chest pain or shortness of breath. She denied abdominal pain, nausea, vomiting, or diarrhea. There was no rash or skin lesion. Assessment in the ED was remarkable for her presenting temperature of 101.8 which responded to Tylenol. She met 2 of 4 SIRS criteria with her temperature and pulse, and lactic acid was normal.  Urinalysis showed some bacteria but no strong abnormalities suggesting infection. Blood culture was obtained from 2 sites. Chest x-ray was negative.   The patient is admitted for a suspected complicated UTI developing on treatment and complicated by weakness and inability to walk. In the emergency department she received 1500 cc of normal saline as well as 1 g of Rocephin. Patient is followed for renal insufficiency by Dr. Dimas Tejada in [place]. Past Medical History:  Past Medical History:   Diagnosis Date    Arthritis     Ill-defined condition     vertigo    Thromboembolus ([address]     Urinary bladder incontinence        Past Surgical History:  Past Surgical History:   Procedure Laterality Date    HX GYN      hysterectomy       Medication:  Prior to Admission medications    Medication Sig Start Date End Date Taking? Authorizing Provider   nystatin (MYCOSTATIN) powder Apply  to affected area. 9/21/21  Yes Other, MD Parker   acetaminophen (TYLENOL) 650 mg TbER Take 650 mg by mouth every eight (8) hours as needed. Yes Other, MD Parker   loratadine (CLARITIN) 10 mg tablet Take 10 mg by mouth in the morning. Yes Other, MD Parker   trimethoprim-sulfamethoxazole (BACTRIM, SEPTRA)  mg per tablet  7/29/22  Yes Other, MD Parker   oxybutynin (DITROPAN) 5 mg tablet Take 5 mg by mouth two (2) times a day. Yes Other, MD Parker   mirabegron ER (MYRBETRIQ) 25 mg ER tablet Take 25 mg by mouth every evening. Indications: a condition where the urge to urinate results in urine leakage   Yes Provider, Historical   apixaban (ELIQUIS) 5 mg tablet Take 5 mg by mouth two (2) times a day. Indications: blood clot in a deep vein of the extremities   Yes Provider, Historical   vit a,c & e-lutein-minerals (OCUVITE) tablet 1 Tablet two (2) times a day. Yes Other, MD Parker   dilTIAZem ER (CARDIZEM LA) 240 mg Tb24 tablet Take 240 mg by mouth every evening. Yes Other, MD Parker   hydroCHLOROthiazide (HYDRODIURIL) 12.5 mg tablet Take 12.5 mg by mouth daily. Will try to take in morning   Yes Cruz, MD Parker   levothyroxine (SYNTHROID) 100 mcg tablet Take 100 mcg by mouth Daily (before breakfast).    Yes Other, MD Parker   nitrofurantoin, macrocrystal-monohydrate, (MACROBID) 100 mg capsule Take 1 Capsule by mouth daily. Patient not taking: Reported on 8/3/2022 1/13/22   Parker Arroyo MD   lidocaine HCL 4 % ptmd 1 Patch by Apply Externally route daily as needed for Pain. On for 12 hours and off for 12 hours  Patient not taking: Reported on 8/3/2022 1/27/22   Johny Turk MD   Biotin 2,500 mcg cap Take 2,500 mcg by mouth daily. 11/1/21   Jet Iniguez MD   furosemide (LASIX) 20 mg tablet Take 20 mg by mouth daily as needed (swelling in legs). Parker Arroyo MD   meclizine (ANTIVERT) 12.5 mg tablet Take 12.5 mg by mouth three (3) times daily as needed (vertigo). Parker Arroyo MD       Allergies: Allergies   Allergen Reactions    Doxycycline Nausea and Vomiting       Social History:  Social History     Tobacco Use    Smoking status: Never    Smokeless tobacco: Never   Substance Use Topics    Alcohol use: Never    Drug use: Never       Family History:  History reviewed. No pertinent family history.     ROS:  Total of 12 systems reviewed as follows:  POSITIVE= bolded text  Negative = text not bolded       General:  fever, chills, sweats, generalized weakness, weight loss/gain, loss of appetite   Eyes:    blurred vision, eye pain, loss of vision, double vision  ENT:    rhinorrhea, pharyngitis   Respiratory:  cough, sputum production, SOB, VELASCO, wheezing, pleuritic pain   Cardiology:   chest pain, palpitations, orthopnea, PND, edema, syncope   Gastrointestinal:  abdominal pain , N/V, diarrhea, dysphagia, constipation, bleeding   Genitourinary:  frequency, urgency, dysuria, hematuria, incontinence, prostatism   Muskuloskeletal: arthralgia, myalgia, back pain  Hematology:   easy bruising, nose or gum bleeding, lymphadenopathy   Dermatological: rash, ulceration, pruritis, color change / jaundice  Endocrine:   hot flashes or polydipsia   Neurological:  headache, dizziness, confusion, focal weakness, paresthesia, speech difficulties, memory loss, gait difficulty  Psychological: feelings of anxiety, depression, agitation      PHYSICAL EXAM:  Patient Vitals for the past 24 hrs:   Temp Pulse Resp BP SpO2   08/03/22 1042 -- 76 16 109/68 98 %   08/03/22 1037 98.7 °F (37.1 °C) -- -- -- --   08/03/22 1002 -- 77 23 (!) 138/55 98 %   08/03/22 0935 -- 75 25 (!) 133/45 98 %   08/03/22 0919 98.8 °F (37.1 °C) -- -- -- --   08/03/22 0907 -- 82 21 (!) 133/54 96 %   08/03/22 0905 -- 78 20 -- 94 %   08/03/22 0852 -- 74 17 (!) 122/46 95 %   08/03/22 0841 -- 75 24 -- 96 %   08/03/22 0835 -- 80 26 -- 96 %   08/03/22 0827 -- 79 24 -- 99 %   08/03/22 0826 -- 81 (!) 34 -- 99 %   08/03/22 0812 -- 88 29 (!) 132/40 99 %   08/03/22 0811 -- 92 21 -- 99 %   08/03/22 0805 -- (!) 112 (!) 32 -- 97 %   08/03/22 0756 -- 85 26 -- 95 %   08/03/22 0741 -- 93 18 -- 94 %   08/03/22 0735 -- 86 13 -- 97 %   08/03/22 0729 (!) 101.2 °F (38.4 °C) 92 18 (!) 140/67 95 %   08/03/22 0727 -- -- -- (!) 141/67 --       General:    Alert, cooperative, no distress, appears stated age. HEENT: Atraumatic, anicteric sclerae, pink conjunctivae     No oral ulcers, mucosa moist, throat clear, dentition fair  Neck:  Supple, symmetrical;   thyroid non tender  Lungs:   Clear to auscultation bilaterally. No wheezing or rhonchi. No rales. Chest wall:  No tenderness. No accessory muscle use. Heart:   Regular rhythm. No  murmur. Tr edema  Abdomen:   Soft, obese, non-tender. Not distended. Bowel sounds normal  Extremities: No cyanosis. No clubbing      Chronic venous stasis with some erythema / warmth L LE    Capillary refill normal,  Radial pulse 2+,  DP 1+  Skin:     Not pale. Not jaundiced. No rashes   Psych:  Not depressed. Not anxious or agitated. Neurologic: EOMs intact. No facial asymmetry. No aphasia or slurred speech. Symmetrical strength, Sensation grossly intact.  Alert and oriented     Lab Data Reviewed:    Recent Results (from the past 24 hour(s))   EKG, 12 LEAD, INITIAL    Collection Time: 08/03/22  7:46 AM   Result Value Ref Range Ventricular Rate 91 BPM    Atrial Rate 91 BPM    P-R Interval 208 ms    QRS Duration 128 ms    Q-T Interval 384 ms    QTC Calculation (Bezet) 472 ms    Calculated P Axis 63 degrees    Calculated R Axis -76 degrees    Calculated T Axis 55 degrees    Diagnosis       Normal sinus rhythm  Left axis deviation  Left bundle branch block  Abnormal ECG  When compared with ECG of 29-OCT-2021 19:28,  No significant change was found     LACTIC ACID    Collection Time: 08/03/22  7:59 AM   Result Value Ref Range    Lactic acid 1.9 0.4 - 2.0 MMOL/L   METABOLIC PANEL, COMPREHENSIVE    Collection Time: 08/03/22  7:59 AM   Result Value Ref Range    Sodium 139 136 - 145 mmol/L    Potassium 3.9 3.5 - 5.1 mmol/L    Chloride 102 97 - 108 mmol/L    CO2 29 21 - 32 mmol/L    Anion gap 8 5 - 15 mmol/L    Glucose 126 (H) 65 - 100 mg/dL    BUN 16 6 - 20 MG/DL    Creatinine 1.46 (H) 0.55 - 1.02 MG/DL    BUN/Creatinine ratio 11 (L) 12 - 20      GFR est AA 41 (L) >60 ml/min/1.73m2    GFR est non-AA 34 (L) >60 ml/min/1.73m2    Calcium 9.3 8.5 - 10.1 MG/DL    Bilirubin, total 0.7 0.2 - 1.0 MG/DL    ALT (SGPT) 29 12 - 78 U/L    AST (SGOT) 19 15 - 37 U/L    Alk. phosphatase 129 (H) 45 - 117 U/L    Protein, total 7.4 6.4 - 8.2 g/dL    Albumin 3.4 (L) 3.5 - 5.0 g/dL    Globulin 4.0 2.0 - 4.0 g/dL    A-G Ratio 0.9 (L) 1.1 - 2.2     CBC WITH AUTOMATED DIFF    Collection Time: 08/03/22  7:59 AM   Result Value Ref Range    WBC 10.3 3.6 - 11.0 K/uL    RBC 4.29 3.80 - 5.20 M/uL    HGB 12.6 11.5 - 16.0 g/dL    HCT 38.9 35.0 - 47.0 %    MCV 90.7 80.0 - 99.0 FL    MCH 29.4 26.0 - 34.0 PG    MCHC 32.4 30.0 - 36.5 g/dL    RDW 14.3 11.5 - 14.5 %    PLATELET 524 739 - 313 K/uL    MPV 8.2 (L) 8.9 - 12.9 FL    NRBC 0.0 0  WBC    ABSOLUTE NRBC 0.00 0.00 - 0.01 K/uL    NEUTROPHILS 84 (H) 32 - 75 %    LYMPHOCYTES 9 (L) 12 - 49 %    MONOCYTES 7 5 - 13 %    EOSINOPHILS 0 0 - 7 %    BASOPHILS 0 0 - 1 %    IMMATURE GRANULOCYTES 0 0.0 - 0.5 %    ABS.  NEUTROPHILS 8.6 (H) 1.8 - 8.0 K/UL    ABS. LYMPHOCYTES 0.9 0.8 - 3.5 K/UL    ABS. MONOCYTES 0.7 0.0 - 1.0 K/UL    ABS. EOSINOPHILS 0.0 0.0 - 0.4 K/UL    ABS. BASOPHILS 0.0 0.0 - 0.1 K/UL    ABS. IMM. GRANS. 0.0 0.00 - 0.04 K/UL    DF AUTOMATED     TROPONIN-HIGH SENSITIVITY    Collection Time: 08/03/22  7:59 AM   Result Value Ref Range    Troponin-High Sensitivity 14 0 - 51 ng/L   URINALYSIS W/ REFLEX CULTURE    Collection Time: 08/03/22  8:05 AM    Specimen: Urine   Result Value Ref Range    Color YELLOW/STRAW      Appearance CLEAR CLEAR      Specific gravity 1.010 1.003 - 1.030      pH (UA) 7.5 5.0 - 8.0      Protein TRACE (A) NEG mg/dL    Glucose Negative NEG mg/dL    Ketone Negative NEG mg/dL    Bilirubin Negative NEG      Blood TRACE (A) NEG      Urobilinogen 0.2 0.2 - 1.0 EU/dL    Nitrites Negative NEG      Leukocyte Esterase Negative NEG      WBC 0-4 0 - 4 /hpf    RBC 5-10 0 - 5 /hpf    Epithelial cells FEW FEW /lpf    Bacteria 1+ (A) NEG /hpf    UA:UC IF INDICATED CULTURE NOT INDICATED BY UA RESULT CNI     COVID-19 WITH INFLUENZA A/B    Collection Time: 08/03/22  8:46 AM   Result Value Ref Range    SARS-CoV-2 by PCR Not detected NOTD      Influenza A by PCR Not detected NOTD      Influenza B by PCR Not detected NOTD         EKG: NSR 91 bpm, LAD, LBBB, NSC    RADIOLOGY  pCXR 8/3:  A portable AP radiograph of the chest was obtained at the 12 hours. The patient is on a cardiac monitor. The lungs are clear. Cardiac silhouette  is stable. .  Bony structures are unchanged.    IMPRESSION: No acute abnormality    Care Plan discussed with:   Patient x    Family     RN x         Consultant      Expected  Disposition:   Home with Family x   HH/PT/OT/RN    SNF/LTC    MARICRUZ      TOTAL TIME:  60 Minutes      Comments    x Reviewed previous records   >50% of visit spent in counseling and coordination of care x Discussion with patient and/or family and questions answered _______________________________________________________  Given the patient's current clinical presentation, I have a high level of concern for decompensation if discharged from the emergency department. Complex decision making was performed, which includes reviewing the patient's available past medical records, laboratory results, and x-ray films. My assessment of this patient's clinical condition and my plan of care is as follows. ASSESSMENT / PLAN    Principal Problem:    Complicated UTI (urinary tract infection) (9/12/2020)  Active Problems:    Incontinence of urine in female (9/13/2020)  Patient presenting with fever and weakness. Meets 2 of 4 SIRS criteria with temperature and tachycardia  Blood and urine cultures pending  Has been chronically on Nitrofurantoin suppressive tx - recently prescribed Septra over the past 5 days  Patient given Rocephin 1 g in the ED, 1500 cc IV normal saline  Continue treatment and monitor results pending      SIRS (systemic inflammatory response syndrome) (HCC) (8/3/2022)    Weakness generalized (8/3/2022)  Functional decline over the past couple weeks  Treat the underlying acute illness  PT OT for functional rehab      Chronic deep vein thrombosis (DVT) of femoral vein of both lower extremities ([address] (9/13/2020)    Venous stasis of both lower extremities (9/13/2020)  Maintain Eliquis 5 mg twice daily      Acquired hypothyroidism (9/13/2020)  Maintain Levothyroxine 100 mcg daily  Follow-up TSH  Last TSH September 2021 1.28 /  Feb 2022 2.00      Essential hypertension (9/13/2020)    Stage IIib CKD  Monitor on current treatment with Cardizem  mg daily. Holding Lasix and HCTZ. Dr. 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   He recommended Nitrofurantoin be switched to Trimethoprim or SS Bactrim    Gretes, Lucille Chase MD    [place], [address]    [phone] (Work)    [phone] (Fax)            SAFETY:   Code Status:Full  DVT prophylaxis:Eliquis  Stress Ulcer prophylaxis: Pepcid  Bladder catheter:no  Family Contact Info:  Primary Emergency Contact: SantamariaKaren, Fredrick Phone: [phone]  Bedded: [facility] Room 116/01  Disposition: TBD, likely home when stable  Admission status:  Inpatient    -Tentative plan of care discussed with patient / family, who demonstrated understanding and is in agreement to the above  -Case was reviewed with the ED Provider, [facility], Karina Price,  [address] [place] [address], MD  [facility] Hospitalist  [phone]

## 2022-08-03 NOTE — PROGRESS NOTES
Spiritual Care Assessment/Progress Note  Jesse Fuentes      NAME: Yared Charles      MRN: [id number]  AGE: 81 y.o.  SEX: female  Episcopal Affiliation: Mandaeism   Language: English     8/3/2022     Total Time (in minutes): 11     Spiritual Assessment begun in [facility] MED/SURG through conversation with:         [x]Patient        [x] Family    [] Friend(s)        Reason for Consult: Initial/Spiritual assessment, patient floor     Spiritual beliefs: (Please include comment if needed)     [x] Identifies with a stanley tradition:         [x] Supported by a stanley community:            [] Claims no spiritual orientation:           [] Seeking spiritual identity:                [] Adheres to an individual form of spirituality:           [] Not able to assess:                           Identified resources for coping:      [] Prayer                               [] Music                  [] Guided Imagery     [x] Family/friends                 [] Pet visits     [] Devotional reading                         [] Unknown     [] Other:                                                 Interventions offered during this visit: (See comments for more details)    Patient Interventions: Affirmation of emotions/emotional suffering, Affirmation of stanley, Iconic (affirming the presence of God/Higher Power), Initial/Spiritual assessment, patient floor, Prayer (assurance of)           Plan of Care:     [x] Support spiritual and/or cultural needs    [] Support AMD and/or advance care planning process      [] Support grieving process   [] Coordinate Rites and/or Rituals    [] Coordination with community clergy   [] No spiritual needs identified at this time   [] Detailed Plan of Care below (See Comments)  [] Make referral to Music Therapy  [] Make referral to Pet Therapy     [] Make referral to Addiction services  [] Make referral to [facility]  [] Make referral to Spiritual Care Partner  [] No future visits requested        [] Contact Spiritual Care for further referrals     Comments: Initial spiritual assessment in Rm 116 in Med/Surg. Patient was alone in the room during the visit. Patient mentioned her daughter who I serve with on a local community group.  Provided empathic listening and spiritual support  Advised of  Availability    [address] [place] [address]

## 2022-08-03 NOTE — PROGRESS NOTES
Care Management Interventions  PCP Verified by CM: Yes (Ling Goddard \)  Last Visit to PCP: 02/08/22  Palliative Care Criteria Met (RRAT>21 & CHF Dx)?:  (No MD order)  Mode of Transport at Discharge: Other (see comment) (POV)  Transition of Care Consult (CM Consult): Discharge Planning  MyChart Signup: No  Discharge Durable Medical Equipment: No  Physical Therapy Consult: Yes  Occupational Therapy Consult: Yes  Speech Therapy Consult: No  Support Systems: Child(jesi)  Confirm Follow Up Transport: Family  The Plan for Transition of Care is Related to the Following Treatment Goals : Treat UTI  Discharge Location  Patient Expects to be Discharged to[de-identified] Home with home health    Patient lives at home alone. She has a daughter that lives in [place] and another daughter that lives in [place]. She has an ACP document that states her daughter Lb Haynes is primary healthcare decision maker. Told patient there is no active ACP document on file. Asked her to bring it in. She states understanding. Patient uses a walker and cane at home. She states she uses the cane in the shower because the walker is too big for the shower. Patient is in inpatient status. Registration already obtained 1st IMM. Patient told to contact care management for any questions or concerns.      Reason for Admission:  UTI                      RUR Score:          9% LOW            Plan for utilizing home health:      TBD, likely      PCP: First and Last name:  Royal Levy NP     Name of Practice: [address]    Are you a current patient: Yes/No: yes   Approximate date of last visit: 2/8/2022   Can you participate in a virtual visit with your PCP: Yes                    Current Advanced Directive/Advance Care Plan: Full Code      Healthcare Decision Maker:   Click here to complete [address] including selection of the Healthcare Decision Maker Relationship (ie \"Primary\")             Primary Decision Maker: Karen Santamaria - Veronika - [phone]                  Transition of Care Plan:                  Home when medically stable. Advance Care Planning     General Advance Care Planning (ACP) Conversation      Date of Conversation: 8/3/2022  Conducted with: Patient with Decision Making Capacity    Healthcare Decision Maker:     Primary Decision Maker: Karen Santamaria - [phone]  Click here to complete [address] including selection of the Healthcare Decision Maker Relationship (ie \"Primary\")    Today we documented Decision Maker(s) consistent with Legal Next of Kin hierarchy. Content/Action Overview:    Has ACP document(s) NOT on file - requested patient to provide  Topics discussed: treatment goals  Additional Comments: n/a      Length of Voluntary ACP Conversation in minutes:  16 minutes    Radha

## 2022-08-03 NOTE — PROGRESS NOTES
Bedside and Verbal shift change report given to BUBBA Alexander RN (oncoming nurse) by GOLDIE Bruno (offgoing nurse). Report included the following information SBAR and Kardex. Oakley score 3  Bed/chair alarm yes in use. If in use it is set at the highest volume. Intravenous fluids were administered, none    ml/hr  Patient Vitals for the past 12 hrs:   Temp Pulse Resp BP SpO2   08/03/22 1706 99.5 °F (37.5 °C) 89 20 130/63 97 %   08/03/22 1448 99.7 °F (37.6 °C) -- -- -- --   08/03/22 1115 97.8 °F (36.6 °C) 80 20 (!) 118/56 96 %   08/03/22 1042 -- 76 16 109/68 98 %   08/03/22 1037 98.7 °F (37.1 °C) -- -- -- --   08/03/22 1002 -- 77 23 (!) 138/55 98 %   08/03/22 0935 -- 75 25 (!) 133/45 98 %   08/03/22 0919 98.8 °F (37.1 °C) -- -- -- --   08/03/22 0907 -- 82 21 (!) 133/54 96 %   08/03/22 0905 -- 78 20 -- 94 %   08/03/22 0852 -- 74 17 (!) 122/46 95 %   08/03/22 0841 -- 75 24 -- 96 %   08/03/22 0835 -- 80 26 -- 96 %   08/03/22 0827 -- 79 24 -- 99 %   08/03/22 0826 -- 81 (!) 34 -- 99 %   08/03/22 0812 -- 88 29 (!) 132/40 99 %   08/03/22 0811 -- 92 21 -- 99 %   08/03/22 0805 -- (!) 112 (!) 32 -- 97 %   08/03/22 0756 -- 85 26 -- 95 %     No flowsheet data found. All lab results for the last 24 hours reviewed.

## 2022-08-04 LAB
ANION GAP SERPL CALC-SCNC: 7 MMOL/L (ref 5–15)
BACTERIA SPEC CULT: ABNORMAL
BASOPHILS # BLD: 0.1 K/UL (ref 0–0.1)
BASOPHILS NFR BLD: 1 % (ref 0–1)
BUN SERPL-MCNC: 16 MG/DL (ref 6–20)
BUN/CREAT SERPL: 12 (ref 12–20)
CALCIUM SERPL-MCNC: 8.7 MG/DL (ref 8.5–10.1)
CC UR VC: ABNORMAL
CHLORIDE SERPL-SCNC: 105 MMOL/L (ref 97–108)
CO2 SERPL-SCNC: 26 MMOL/L (ref 21–32)
CREAT SERPL-MCNC: 1.37 MG/DL (ref 0.55–1.02)
DIFFERENTIAL METHOD BLD: ABNORMAL
EOSINOPHIL # BLD: 0.1 K/UL (ref 0–0.4)
EOSINOPHIL NFR BLD: 1 % (ref 0–7)
ERYTHROCYTE [DISTWIDTH] IN BLOOD BY AUTOMATED COUNT: 14.5 % (ref 11.5–14.5)
GLUCOSE SERPL-MCNC: 102 MG/DL (ref 65–100)
HCT VFR BLD AUTO: 36.8 % (ref 35–47)
HGB BLD-MCNC: 11.9 G/DL (ref 11.5–16)
IMM GRANULOCYTES # BLD AUTO: 0 K/UL (ref 0–0.04)
IMM GRANULOCYTES NFR BLD AUTO: 1 % (ref 0–0.5)
LYMPHOCYTES # BLD: 1.4 K/UL (ref 0.8–3.5)
LYMPHOCYTES NFR BLD: 18 % (ref 12–49)
MAGNESIUM SERPL-MCNC: 2.2 MG/DL (ref 1.6–2.4)
MCH RBC QN AUTO: 29.2 PG (ref 26–34)
MCHC RBC AUTO-ENTMCNC: 32.3 G/DL (ref 30–36.5)
MCV RBC AUTO: 90.2 FL (ref 80–99)
MONOCYTES # BLD: 0.8 K/UL (ref 0–1)
MONOCYTES NFR BLD: 10 % (ref 5–13)
NEUTS SEG # BLD: 5.5 K/UL (ref 1.8–8)
NEUTS SEG NFR BLD: 69 % (ref 32–75)
NRBC # BLD: 0 K/UL (ref 0–0.01)
NRBC BLD-RTO: 0 PER 100 WBC
PLATELET # BLD AUTO: 191 K/UL (ref 150–400)
PMV BLD AUTO: 8.9 FL (ref 8.9–12.9)
POTASSIUM SERPL-SCNC: 4 MMOL/L (ref 3.5–5.1)
RBC # BLD AUTO: 4.08 M/UL (ref 3.8–5.2)
SERVICE CMNT-IMP: ABNORMAL
SODIUM SERPL-SCNC: 138 MMOL/L (ref 136–145)
WBC # BLD AUTO: 7.8 K/UL (ref 3.6–11)

## 2022-08-04 PROCEDURE — 85025 COMPLETE CBC W/AUTO DIFF WBC: CPT

## 2022-08-04 PROCEDURE — 94760 N-INVAS EAR/PLS OXIMETRY 1: CPT

## 2022-08-04 PROCEDURE — 97116 GAIT TRAINING THERAPY: CPT | Performed by: PHYSICAL THERAPIST

## 2022-08-04 PROCEDURE — 65270000029 HC RM PRIVATE

## 2022-08-04 PROCEDURE — 97530 THERAPEUTIC ACTIVITIES: CPT | Performed by: PHYSICAL THERAPIST

## 2022-08-04 PROCEDURE — 74011000250 HC RX REV CODE- 250: Performed by: INTERNAL MEDICINE

## 2022-08-04 PROCEDURE — 36415 COLL VENOUS BLD VENIPUNCTURE: CPT

## 2022-08-04 PROCEDURE — 74011250636 HC RX REV CODE- 250/636: Performed by: INTERNAL MEDICINE

## 2022-08-04 PROCEDURE — 83735 ASSAY OF MAGNESIUM: CPT

## 2022-08-04 PROCEDURE — 80048 BASIC METABOLIC PNL TOTAL CA: CPT

## 2022-08-04 PROCEDURE — 74011250637 HC RX REV CODE- 250/637: Performed by: INTERNAL MEDICINE

## 2022-08-04 PROCEDURE — 74011000258 HC RX REV CODE- 258: Performed by: INTERNAL MEDICINE

## 2022-08-04 PROCEDURE — 97161 PT EVAL LOW COMPLEX 20 MIN: CPT | Performed by: PHYSICAL THERAPIST

## 2022-08-04 PROCEDURE — 97165 OT EVAL LOW COMPLEX 30 MIN: CPT

## 2022-08-04 PROCEDURE — 97535 SELF CARE MNGMENT TRAINING: CPT

## 2022-08-04 RX ADMIN — DILTIAZEM HYDROCHLORIDE 240 MG: 240 CAPSULE, COATED, EXTENDED RELEASE ORAL at 08:39

## 2022-08-04 RX ADMIN — I-VITE, TAB 1000-60-2MG (60/BT) 1 TABLET: TAB at 08:40

## 2022-08-04 RX ADMIN — APIXABAN 5 MG: 5 TABLET, FILM COATED ORAL at 17:22

## 2022-08-04 RX ADMIN — LEVOTHYROXINE SODIUM 100 MCG: 0.1 TABLET ORAL at 06:47

## 2022-08-04 RX ADMIN — SODIUM CHLORIDE, PRESERVATIVE FREE 10 ML: 5 INJECTION INTRAVENOUS at 22:24

## 2022-08-04 RX ADMIN — SODIUM CHLORIDE, PRESERVATIVE FREE 10 ML: 5 INJECTION INTRAVENOUS at 09:12

## 2022-08-04 RX ADMIN — APIXABAN 5 MG: 5 TABLET, FILM COATED ORAL at 08:39

## 2022-08-04 RX ADMIN — CEFTRIAXONE SODIUM 1 G: 1 INJECTION, POWDER, FOR SOLUTION INTRAMUSCULAR; INTRAVENOUS at 08:40

## 2022-08-04 RX ADMIN — ACETAMINOPHEN 650 MG: 325 TABLET ORAL at 05:38

## 2022-08-04 RX ADMIN — TROSPIUM CHLORIDE 20 MG: 20 TABLET, FILM COATED ORAL at 08:40

## 2022-08-04 RX ADMIN — I-VITE, TAB 1000-60-2MG (60/BT) 1 TABLET: TAB at 17:21

## 2022-08-04 RX ADMIN — ACETAMINOPHEN 650 MG: 325 TABLET ORAL at 17:21

## 2022-08-04 RX ADMIN — SODIUM CHLORIDE, PRESERVATIVE FREE 10 ML: 5 INJECTION INTRAVENOUS at 17:22

## 2022-08-04 RX ADMIN — FAMOTIDINE 20 MG: 20 TABLET, FILM COATED ORAL at 17:22

## 2022-08-04 NOTE — PROGRESS NOTES
Bedside shift change report given to Eduard Luna (oncoming nurse) by Carlitos Mena (offgoing nurse). Report included the following information SBAR, Kardex, Intake/Output, MAR, Recent Results, and Med Rec Status.

## 2022-08-04 NOTE — PROGRESS NOTES
Problem: Mobility Impaired (Adult and Pediatric)  Goal: *Acute Goals and Plan of Care (Insert Text)  Description: FUNCTIONAL STATUS PRIOR TO ADMISSION: Patient was modified independent using a rollator for functional mobility. HOME SUPPORT PRIOR TO ADMISSION: The patient lived alone with caregiver 4-5x/week, 4 hours/day to provide assistance with IADLs and driving activities. Physical Therapy Goals  Initiated 8/4/2022  1. Patient will move from supine to sit and sit to supine  in bed with independence within 7 day(s). 2.  Patient will transfer from bed to chair and chair to bed with modified independence using the least restrictive device within 7 day(s). 3.  Patient will perform sit to stand with modified independence within 7 day(s). 4.  Patient will ambulate with modified independence for 150 feet with the least restrictive device within 7 day(s). Outcome: Not Met   PHYSICAL THERAPY EVALUATION  Patient: Lavinia Man (42 y.o. female)  Date: 8/4/2022  Primary Diagnosis: UTI (urinary tract infection) [N39.0]       Precautions:  Fall    ASSESSMENT  Based on the objective data described below, the patient presents with general decrease in strength/activity tolerance, decreased standing balance/tolerance, and decreased functional mobility, including bed mobility, transfers, and gait. Patient does not have stairs in her home and has a ramp to enter. She is cooperative and eager to participate in PT Evaluation. She comes to sit with increased effort and supervision only. Transfers and gait in room are contact guard assist with pediatric-height rolling walker, which is left in room for patient's use. She ambulates a total of 75 feet with rolling walker 960 feet, seated rest, then 15 feet with dynamic standing activity) and contact guard assist for safety. Depending on progress while inpatient, patient may or may not need HHPT at discharge, will follow to determine best recommendation.     Current Level of Function Impacting Discharge (mobility/balance): supervision bed mobility, CGA transfers and gait up to 60 feet with rolling walker    Functional Outcome Measure: The patient scored 65/100 on the Barthel outcome measure which is indicative of minimal dependence with ADL/functional mobilty. Other factors to consider for discharge: lives alone, has caregiver, daughter/son-in-law live nearby     Patient will benefit from skilled therapy intervention to address the above noted impairments. PLAN :  Recommendations and Planned Interventions: bed mobility training, transfer training, gait training, therapeutic exercises, neuromuscular re-education, edema management/control, patient and family training/education, and therapeutic activities      Frequency/Duration: Patient will be followed by physical therapy:  4 times a week to address goals.     Recommendation for discharge: (in order for the patient to meet his/her long term goals)  To be determined: none vs HHPT    This discharge recommendation:  Has not yet been discussed the attending provider and/or case management    IF patient discharges home will need the following DME: patient owns DME required for discharge         SUBJECTIVE:   Patient stated I wake up at 5am.    OBJECTIVE DATA SUMMARY:   HISTORY:    Past Medical History:   Diagnosis Date    Arthritis     Ill-defined condition     vertigo    Thromboembolus ([address]     Urinary bladder incontinence      Past Surgical History:   Procedure Laterality Date    HX GYN      hysterectomy       Personal factors and/or comorbidities impacting plan of care: high BMI    Home Situation  Home Environment: Private residence  # Steps to Enter: 0  Wheelchair Ramp: Yes  One/Two Story Residence: One story  Living Alone: Yes  Support Systems: Caregiver/Home Care Staff (caregiver 4-5x/ week, 4 hours/day)  Patient Expects to be Discharged to[de-identified] Home with home health  Current DME Used/Available at Home: Wheelchair, Shower chair, Grab bars, Cane, straight, Walker, rollator  Tub or Shower Type: Shower    EXAMINATION/PRESENTATION/DECISION MAKING:   Critical Behavior:  Neurologic State: Alert  Orientation Level: Appropriate for age, Oriented X4  Cognition: Appropriate decision making, Follows commands  Safety/Judgement: Awareness of environment, Fall prevention, Good awareness of safety precautions  Hearing: Auditory  Auditory Impairment: Hard of hearing, bilateral  Skin:  IV left UE  Edema: bilateral distal LEs with redness noted  Range Of Motion:  AROM: Generally decreased, functional                       Strength:    Strength: Generally decreased, functional                    Tone & Sensation:   Tone: Normal              Sensation: Intact               Coordination:  Coordination: Within functional limits    Functional Mobility:  Bed Mobility:  Rolling: Supervision  Supine to Sit: Supervision; Additional time (increased effort)        Transfers:  Sit to Stand: Contact guard assistance;Assist x1  Stand to Sit: Contact guard assistance;Assist x1        Bed to Chair: Contact guard assistance;Assist x1;Adaptive equipment              Balance:   Sitting: Intact  Standing: Impaired; Without support  Standing - Static: Constant support;Good  Standing - Dynamic : Constant support; Fair  Ambulation/Gait Training:  Distance (ft): 75 Feet (ft) (60 feet, seated rest, then 15 feet with standing activity)  Assistive Device: Gait belt;Walker, rolling  Ambulation - Level of Assistance: Contact guard assistance;Assist x1     Gait Description (WDL): Exceptions to WDL  Gait Abnormalities: Decreased step clearance (pushes walker too far away from body, can correct with verbal cues)     Left Side Weight Bearing: Full  Base of Support: Widened     Speed/Michelle: Slow  Step Length: Right shortened;Left shortened       Therapeutic Exercises:   AROM both UE/LEs prior to mobility training    Functional Measure:  Barthel Index:    Bathin  Bladder: 10  Bowels: 10  Groomin  Dressin  Feeding: 10  Mobility: 10  Stairs: 0  Toilet Use: 5  Transfer (Bed to Chair and Back): 10  Total: 65/100       The Barthel ADL Index: Guidelines  1. The index should be used as a record of what a patient does, not as a record of what a patient could do. 2. The main aim is to establish degree of independence from any help, physical or verbal, however minor and for whatever reason. 3. The need for supervision renders the patient not independent. 4. A patient's performance should be established using the best available evidence. Asking the patient, friends/relatives and nurses are the usual sources, but direct observation and common sense are also important. However direct testing is not needed. 5. Usually the patient's performance over the preceding 24-48 hours is important, but occasionally longer periods will be relevant. 6. Middle categories imply that the patient supplies over 50 per cent of the effort. 7. Use of aids to be independent is allowed. Score Interpretation (from [address] [facility] 83)    Independent   60-79 Minimally independent   40-59 Partially dependent   20-39 Very dependent   <20 Totally dependent     -Meka Grimaldo., Barthel, DYasirW. (1965). Functional evaluation: the Barthel Index. [facility] ([address] (). The Barthel activities of daily living index: self-reporting versus actual performance in the old (> or = 75 years). Journal of [address] [place] [address] 45(7), 14 [facility], ASTRIDF, Keven Mcnally., Cheryl Cargo. (1999). Measuring the change in disability after inpatient rehabilitation; comparison of the responsiveness of the Barthel Index and Functional [place]kinson Measure. Journal of Neurology, Neurosurgery, and Psychiatry, 66(4), 280-924. Rubio Orozco, N.J.A, OSBALOD Sims.MARY, & Damaris Campos M.A. (2004) Assessment of post-stroke quality of life in cost-effectiveness studies:  The usefulness of the Barthel Index and the EuroQoL-5D. Quality of Life Research, 15, 201-81           Physical Therapy Evaluation Charge Determination   History Examination Presentation Decision-Making   MEDIUM  Complexity : 1-2 comorbidities / personal factors will impact the outcome/ POC  LOW Complexity : 1-2 Standardized tests and measures addressing body structure, function, activity limitation and / or participation in recreation  LOW Complexity : Stable, uncomplicated  LOW Complexity : FOTO score of       Based on the above components, the patient evaluation is determined to be of the following complexity level: LOW     Pain Rating:  Patient does not complain of pain    Activity Tolerance:   Fair, SpO2 stable on RA, and requires rest breaks    After treatment patient left in no apparent distress:   Sitting in chair, Heels elevated for pressure relief, Call bell within reach, and Bed / chair alarm activated    COMMUNICATION/EDUCATION:   The patients plan of care was discussed with: Occupational therapist, Registered nurse, and Certified nursing assistant/patient care technician. Fall prevention education was provided and the patient/caregiver indicated understanding., Patient/family have participated as able in goal setting and plan of care. , and Patient/family agree to work toward stated goals and plan of care.     Thank you for this referral.  Keon Milligan, PT   Time Calculation: 29 mins

## 2022-08-04 NOTE — PROGRESS NOTES
OCCUPATIONAL THERAPY EVALUATION  Patient: Chinmay Peres (42 y.o. female)  Date: 8/4/2022  Primary Diagnosis: UTI (urinary tract infection) [N39.0]       Precautions: swelling Bilateral LES,  Fall    ASSESSMENT  Based on the objective data described below, the patient presents with Complication from UTI and generalized weakness. Pt is alert and oriented x 4 and lives alone. Pt has HHA 4x week x 4hrs who shops, cleans,makes meals and does laundry. Pt gets up early and showers sitting in shower chair with [facility] and has grab bars. Pt brings cane into shower as rollator does not fit. Pt does not drive. Pt reports she does basic self-care and transfers Independently Pt will benefit from OT to improve self-care and functional mobility and transfers. Current Level of Function Impacting Discharge (ADLs/self-care): partially independent ranging from Max A- SUSAN    Functional Outcome Measure: The patient scored Total: 65/100 on the Barthel Index outcome measure which is indicative of being partially independent in basic self-care. Other factors to consider for discharge: lives alone,fall risk, hx of vertigo     Patient will benefit from skilled therapy intervention to address the above noted impairments. PLAN :  Recommendations and Planned Interventions: self care training, functional mobility training, therapeutic exercise, balance training, endurance activities, patient education, and home safety training    Frequency/Duration: Patient will be followed by occupational therapy 3-5 times a week/ 1-2 times a day to address goals. Recommendation for discharge: (in order for the patient to meet his/her long term goals)TBD      This discharge recommendation:  Has not yet been discussed the attending provider and/or case management    IF patient discharges home will need the following DME: patient owns DME required for discharge       SUBJECTIVE:   Patient stated It good to be feeling better.     OBJECTIVE DATA SUMMARY:   HISTORY:   Past Medical History:   Diagnosis Date    Arthritis     Ill-defined condition     vertigo    Thromboembolus ([address]     Urinary bladder incontinence      Past Surgical History:   Procedure Laterality Date    HX GYN      hysterectomy       Expanded or extensive additional review of patient history:     Home Situation  Home Environment: Private residence  # Steps to Enter: 0  Hand Rails : Bilateral  Wheelchair Ramp: Yes  One/Two Story Residence: One story  Living Alone: Yes  Support Systems:  (HHA 4x week 4 hours)  Patient Expects to be Discharged to[de-identified] Home with home health  Current DME Used/Available at Home: Wheelchair, Shower chair, Grab bars, Idris Jones, straight, Walker, rollator  Tub or Shower Type: Shower    Hand dominance: Right    EXAMINATION OF PERFORMANCE DEFICITS:  Cognitive/Behavioral Status:  Neurologic State: Alert  Orientation Level: Oriented X4  Cognition: Appropriate decision making; Follows commands  Perception: Appears intact  Perseveration: No perseveration noted  Safety/Judgement: Awareness of environment; Fall prevention;Good awareness of safety precautions    Skin: some skin tears noted on LES    Edema: present in B LES    Hearing: Auditory  Auditory Impairment: Hard of hearing, bilateral    Vision/Perceptual:         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)      Range of Motion:    AROM: Generally decreased, functional  Strength:    Strength: Generally decreased, functional  Coordination:  Coordination: Within functional limits  Fine Motor Skills-Upper: Right Intact; Left Intact    Gross Motor Skills-Upper: Left Intact; Right Intact    Tone & Sensation:    Tone: Normal  Sensation: Intact     Balance:  Sitting: Intact  Standing: Impaired; Without support  Standing - Static: Constant support;Good  Standing - Dynamic : Constant support; Fair    Functional Mobility and Transfers for ADLs:  Bed Mobility:  Rolling: Supervision  Supine to Sit: Supervision; Additional time (increased effort)    Transfers:  Sit to Stand: Contact guard assistance;Assist x1  Stand to Sit: Contact guard assistance;Assist x1  Bed to Chair: Contact guard assistance;Assist x1;Adaptive equipment    ADL Assessment    Type of Bath: Chlorhexidine (CHG); Basin/Soap/Water; Bath Pack    ADL Intervention and task modifications:     Grooming  Grooming Assistance: Modified independent; Set-up  Position Performed: Standing;Seated in chair  Washing Face: Modified independent  Washing Hands: Modified independent; Set-up  Brushing Teeth: Modified independent; Set-up  Brushing/Combing Hair: Modified independent; Set-up    Upper Body Bathing  Bathing Assistance: Moderate assistance (MOD a lowers/ Uppers SBA/CGA)    Type of Bath: Chlorhexidine (CHG); Basin/Soap/Water; Bath Pack      Upper Body Dressing Assistance  Hospital Gown: Modified independent;Stand-by assistance    Lower Body Dressing Assistance  Protective Undergarmet: Minimum assistance;Contact guard assistance    Toileting  Toileting Assistance: Contact guard assistance;Minimum assistance  Bladder Hygiene: Stand-by assistance;Contact guard assistance  Clothing Management: Minimum assistance    Cognitive Retraining  Safety/Judgement: Awareness of environment; Fall prevention;Good awareness of safety precautions    Therapeutic Exercise:  Not done this session   Functional Measure:    Barthel Index:  Bathin  Bladder: 10  Bowels: 10  Groomin  Dressin  Feeding: 10  Mobility: 10  Stairs: 0  Toilet Use: 5  Transfer (Bed to Chair and Back): 10  Total: 65/100      The Barthel ADL Index: Guidelines  1. The index should be used as a record of what a patient does, not as a record of what a patient could do. 2. The main aim is to establish degree of independence from any help, physical or verbal, however minor and for whatever reason. 3. The need for supervision renders the patient not independent. 4. A patient's performance should be established using the best available evidence. Asking the patient, friends/relatives and nurses are the usual sources, but direct observation and common sense are also important. However direct testing is not needed. 5. Usually the patient's performance over the preceding 24-48 hours is important, but occasionally longer periods will be relevant. 6. Middle categories imply that the patient supplies over 50 per cent of the effort. 7. Use of aids to be independent is allowed. Score Interpretation (from [address] [facility] 83)    Independent   60-79 Minimally independent   40-59 Partially dependent   20-39 Very dependent   <20 Totally dependent     -Meka Grimaldo., Barthel, D.W. (1965). Functional evaluation: the Barthel Index. [address] ([address] (1997). The Barthel activities of daily living index: self-reporting versus actual performance in the old (> or = 75 years). Journal of [address] [place] [address] 45(7), 14 [facility], SONJA, Patience Prasons., Michelle Hurley. (1999). Measuring the change in disability after inpatient rehabilitation; comparison of the responsiveness of the Barthel Index and Functional [place] Measure. Journal of Neurology, Neurosurgery, and Psychiatry, 66(4), 685-351. Soraida Kendrick, N.J.A, EMMETT SimsJ.MARY, & Jaime Freitas, M.A. (2004) Assessment of post-stroke quality of life in cost-effectiveness studies: The usefulness of the Barthel Index and the EuroQoL-5D.  Quality of Life Research, 15, 876-42     Occupational Therapy Evaluation Charge Determination   History Examination Decision-Making   LOW Complexity : Brief history review  LOW Complexity : 1-3 performance deficits relating to physical, cognitive , or psychosocial skils that result in activity limitations and / or participation restrictions  LOW Complexity : No comorbidities that affect functional and no verbal or physical assistance needed to complete eval tasks       Based on the above components, the patient evaluation is determined to be of the following complexity level: LOW   Pain Rating:  R knee hurts if legs are straight out in chair 4/-5/10    Activity Tolerance:   Fair    After treatment patient left in no apparent distress:    Sitting in chair, Heels elevated for pressure relief, Call bell within reach, and Bed / chair alarm activated    COMMUNICATION/EDUCATION:   The patients plan of care was discussed with: Physical therapist, Registered nurse, and Certified nursing assistant/patient care technician. Patient/family have participated as able in goal setting and plan of care. This patients plan of care is appropriate for delegation to [facility].     Thank you for this referral.  Marko Guy OT  Time Calculation: 38 mins

## 2022-08-05 LAB
ANION GAP SERPL CALC-SCNC: 9 MMOL/L (ref 5–15)
ATRIAL RATE: 91 BPM
BUN SERPL-MCNC: 19 MG/DL (ref 6–20)
BUN/CREAT SERPL: 15 (ref 12–20)
CALCIUM SERPL-MCNC: 9 MG/DL (ref 8.5–10.1)
CALCULATED P AXIS, ECG09: 63 DEGREES
CALCULATED R AXIS, ECG10: -76 DEGREES
CALCULATED T AXIS, ECG11: 55 DEGREES
CHLORIDE SERPL-SCNC: 104 MMOL/L (ref 97–108)
CO2 SERPL-SCNC: 26 MMOL/L (ref 21–32)
CREAT SERPL-MCNC: 1.31 MG/DL (ref 0.55–1.02)
DIAGNOSIS, 93000: NORMAL
GLUCOSE SERPL-MCNC: 101 MG/DL (ref 65–100)
P-R INTERVAL, ECG05: 208 MS
POTASSIUM SERPL-SCNC: 4 MMOL/L (ref 3.5–5.1)
Q-T INTERVAL, ECG07: 384 MS
QRS DURATION, ECG06: 128 MS
QTC CALCULATION (BEZET), ECG08: 472 MS
SODIUM SERPL-SCNC: 139 MMOL/L (ref 136–145)
TSH SERPL DL<=0.05 MIU/L-ACNC: 2.52 UIU/ML (ref 0.36–3.74)
VENTRICULAR RATE, ECG03: 91 BPM

## 2022-08-05 PROCEDURE — 80048 BASIC METABOLIC PNL TOTAL CA: CPT

## 2022-08-05 PROCEDURE — 74011000250 HC RX REV CODE- 250: Performed by: INTERNAL MEDICINE

## 2022-08-05 PROCEDURE — 74011250636 HC RX REV CODE- 250/636: Performed by: INTERNAL MEDICINE

## 2022-08-05 PROCEDURE — 74011250637 HC RX REV CODE- 250/637: Performed by: INTERNAL MEDICINE

## 2022-08-05 PROCEDURE — 74011000258 HC RX REV CODE- 258: Performed by: INTERNAL MEDICINE

## 2022-08-05 PROCEDURE — 36415 COLL VENOUS BLD VENIPUNCTURE: CPT

## 2022-08-05 PROCEDURE — 77030038269 HC DRN EXT URIN PURWCK BARD -A

## 2022-08-05 PROCEDURE — 65270000029 HC RM PRIVATE

## 2022-08-05 PROCEDURE — 97530 THERAPEUTIC ACTIVITIES: CPT

## 2022-08-05 PROCEDURE — 97116 GAIT TRAINING THERAPY: CPT

## 2022-08-05 PROCEDURE — 94760 N-INVAS EAR/PLS OXIMETRY 1: CPT

## 2022-08-05 PROCEDURE — 84443 ASSAY THYROID STIM HORMONE: CPT

## 2022-08-05 RX ADMIN — SODIUM CHLORIDE, PRESERVATIVE FREE 10 ML: 5 INJECTION INTRAVENOUS at 15:59

## 2022-08-05 RX ADMIN — DILTIAZEM HYDROCHLORIDE 240 MG: 240 CAPSULE, COATED, EXTENDED RELEASE ORAL at 08:49

## 2022-08-05 RX ADMIN — FAMOTIDINE 20 MG: 20 TABLET, FILM COATED ORAL at 17:44

## 2022-08-05 RX ADMIN — I-VITE, TAB 1000-60-2MG (60/BT) 1 TABLET: TAB at 08:49

## 2022-08-05 RX ADMIN — TROSPIUM CHLORIDE 20 MG: 20 TABLET, FILM COATED ORAL at 08:49

## 2022-08-05 RX ADMIN — APIXABAN 5 MG: 5 TABLET, FILM COATED ORAL at 17:44

## 2022-08-05 RX ADMIN — ACETAMINOPHEN 650 MG: 325 TABLET ORAL at 08:58

## 2022-08-05 RX ADMIN — I-VITE, TAB 1000-60-2MG (60/BT) 1 TABLET: TAB at 17:44

## 2022-08-05 RX ADMIN — ACETAMINOPHEN 650 MG: 325 TABLET ORAL at 18:10

## 2022-08-05 RX ADMIN — SODIUM CHLORIDE, PRESERVATIVE FREE 10 ML: 5 INJECTION INTRAVENOUS at 06:36

## 2022-08-05 RX ADMIN — APIXABAN 5 MG: 5 TABLET, FILM COATED ORAL at 08:50

## 2022-08-05 RX ADMIN — LEVOTHYROXINE SODIUM 100 MCG: 0.1 TABLET ORAL at 06:36

## 2022-08-05 RX ADMIN — CEFTRIAXONE SODIUM 1 G: 1 INJECTION, POWDER, FOR SOLUTION INTRAMUSCULAR; INTRAVENOUS at 09:24

## 2022-08-05 NOTE — PROGRESS NOTES
Patient complains of \"toothache where my filling fell out\". Flashlight in the mouth reveals a swollen upper area of right mouth/gums. No drainage or severe redness. She states the pain occurs from mouth - then radiates to face and forehead. She states this has been going on a while. Pain score reported as 10 - she states the pain is worse in the mornings. .    Temp is 99.3. Tylenol provided for pain.

## 2022-08-05 NOTE — PROGRESS NOTES
Bedside shift change report given to [facility] (oncoming nurse) by Dedra Bosworth (offgoing nurse). Report included the following information SBAR, Kardex, and MAR.

## 2022-08-05 NOTE — PROGRESS NOTES
Problem: Hypertension  Goal: *Fluid volume balance  Outcome: Progressing Towards Goal  Goal: *Labs within defined limits  Outcome: Progressing Towards Goal     Problem: Patient Education: Go to Patient Education Activity  Goal: Patient/Family Education  Outcome: Progressing Towards Goal     Problem: Falls - Risk of  Goal: *Absence of Falls  Description: Document Lalitha Christopher Fall Risk and appropriate interventions in the flowsheet.   Outcome: Progressing Towards Goal  Note: Fall Risk Interventions:  Mobility Interventions: Utilize walker, cane, or other assistive device         Medication Interventions: Bed/chair exit alarm, Evaluate medications/consider consulting pharmacy, Patient to call before getting OOB    Elimination Interventions: Call light in reach, Patient to call for help with toileting needs, Stay With Me (per policy), Toilet paper/wipes in reach, Toileting schedule/hourly rounds    History of Falls Interventions: Door open when patient unattended, Room close to nurse's station         Problem: Patient Education: Go to Patient Education Activity  Goal: Patient/Family Education  Outcome: Progressing Towards Goal     Problem: Pain  Goal: *Control of Pain  Outcome: Progressing Towards Goal  Goal: *PALLIATIVE CARE:  Alleviation of Pain  Outcome: Progressing Towards Goal     Problem: General Medical Care Plan  Goal: *Vital signs within specified parameters  Outcome: Progressing Towards Goal  Goal: *Labs within defined limits  Outcome: Progressing Towards Goal  Goal: *Absence of infection signs and symptoms  Outcome: Progressing Towards Goal  Goal: *Optimal pain control at patient's stated goal  Outcome: Progressing Towards Goal  Goal: *Skin integrity maintained  Outcome: Progressing Towards Goal  Goal: *Fluid volume balance  Outcome: Progressing Towards Goal  Goal: *Optimize nutritional status  Outcome: Progressing Towards Goal  Goal: *Anxiety reduced or absent  Outcome: Progressing Towards Goal  Goal: *Progressive mobility and function (eg: ADL's)  Outcome: Progressing Towards Goal     Problem: Patient Education: Go to Patient Education Activity  Goal: Patient/Family Education  Outcome: Progressing Towards Goal     Problem: Discharge Planning  Goal: *Discharge to safe environment  Outcome: Progressing Towards Goal  Goal: *Knowledge of medication management  Outcome: Progressing Towards Goal  Goal: *Knowledge of discharge instructions  Outcome: Progressing Towards Goal     Problem: Patient Education: Go to Patient Education Activity  Goal: Patient/Family Education  Outcome: Progressing Towards Goal     Problem: Patient Education: Go to Patient Education Activity  Goal: Patient/Family Education  Outcome: Progressing Towards Goal     Problem: Patient Education: Go to Patient Education Activity  Goal: Patient/Family Education  Outcome: Progressing Towards Goal

## 2022-08-05 NOTE — PROGRESS NOTES
[facility]  Hospitalist Progress Note    NAME: Silvestre Andrade   :  1935   MRN:  [id number]     Total duration of encounter: 1 day      Interim Hospital Summary: 81 y.o. female who presented on 8680 with Complicated UTI (urinary tract infection). She has a past medical history of Arthritis, Ill-defined condition, Thromboembolus ([address] and Urinary bladder incontinence. .     Presenting to the ED planing with generalized weakness, and a recent medication change which seemed to cause adverse symptoms. On presentation she was febrile and mildly dehydrated. Has h/o recurrent UTI. Rx Rocephin and given 1500 cc NS in the ED. Much better following tx with IVF and antibiotics. DId well with PT/OT, back to baseline. Will follow renal function and cultures. Possible adverse reaction to Sulfa. Subjective:     Chief Complaint / Reason for Physician Visit  \"better\".   Discussed with RN   Did well with therapy, feels back to nl self  No  c/o  Tremor / weakness improved    Review of Systems:  Symptom Y/N Comments  Symptom Y/N Comments   Fever/Chills n   gone  Chest Pain n    Poor Appetite n   Edema n    Cough n   Abdominal Pain n    Sputum n   Joint Pain n    SOB/VELASCO n   Pruritis/Rash n    Nausea/vomit n   Tolerating PT/OT y    Diarrhea n   Tolerating Diet y    Constipation n   Other         Current Facility-Administered Medications:     sodium chloride (NS) flush 5-10 mL, 5-10 mL, IntraVENous, PRN, Kym Ham MD    cefTRIAXone (ROCEPHIN) 1 g in 0.9% sodium chloride (MBP/ADV) 50 mL MBP, 1 g, IntraVENous, Q24H, Kym Ham MD, IV Completed at 22 8658    acetaminophen (TYLENOL) tablet 650 mg, 650 mg, Oral, Q6H PRN, Kym Ham MD, 650 mg at 22 1721    ondansetron (ZOFRAN) injection 4 mg, 4 mg, IntraVENous, Q4H PRN, Kym Ham MD    apixaban Juani Snow) tablet 5 mg, 5 mg, Oral, BID, Kym Ham MD, 5 mg at 22 1722    dilTIAZem ER (CARDIZEM CD) capsule 240 S: Emma ORTIZ reports she is feeling well today.  She denies contractions, bleeding, leakage of fluid.  Feeling normal fetal movement.    Had a headache and swelling on Monday but went away.  Took BP at the time and was normal.  Did not take any medications for headache.  Feels well today.    Has noticed that her heart rate goes higher than usual with activity.  HR has been normal when she is at rest.    Has questions regarding birthing classes and [employer].      O:   Vitals  Vitals:    20 1324   BP: 118/80   Pulse: 80        Exam  Gen: appears comfortable, NAD  Chest: normal effort of respiration  Abdomen: gravid, nondistended  Extremities: warm and well perfused    A/P:  Emma ORTIZ is a 30 year old  at 32w4d who presents for routine prenatal visit.      Prenatal care  -all questions answered  -reviewed tylenol is ok to take for headaches  -encouraged continued gentle exercise but must realize exercise tolerance will be lower than outside of pregnancy.  HR normal in office today.  -call if HR not returning to normal at rest or if having lightheadedness, shortness of breath, chest pain, or other concerning symptoms  -flu shot today    Dispo: return to office in 2 weeks for routine prenatal care     mg, 240 mg, Oral, DAILY, Rashad Funes MD, 240 mg at 08/04/22 0972    furosemide (LASIX) tablet 20 mg, 20 mg, Oral, DAILY PRN, Rashad Funes MD    levothyroxine (SYNTHROID) tablet 100 mcg, 100 mcg, Oral, ACB, Rashad Funes MD, 100 mcg at 08/04/22 4121    trospium (SANCTURA) tablet 20 mg, 20 mg, Oral, DAILY, Rashad Funes MD, 20 mg at 08/04/22 0840    vitamin E-P-C-lutein-minerals (OCUVITE) tablet 1 Tablet, 1 Tablet, Oral, BID, Rashad Funes MD, 1 Tablet at 08/04/22 1721    sodium chloride (NS) flush 5-40 mL, 5-40 mL, IntraVENous, Q8H, Rashad Funes MD, 10 mL at 08/04/22 1722    sodium chloride (NS) flush 5-40 mL, 5-40 mL, IntraVENous, PRN, Rashad Funes MD    polyethylene glycol (MIRALAX) packet 17 g, 17 g, Oral, DAILY PRN, Rashad Funes MD    famotidine (PEPCID) tablet 20 mg, 20 mg, Oral, QPM, Rashad Funes MD, 20 mg at 08/04/22 1722    Objective:     VITALS:   Patient Vitals for the past 12 hrs:   Temp Pulse Resp BP SpO2   08/04/22 1713 98.8 °F (37.1 °C) 72 18 (!) 161/69 95 %       Intake/Output Summary (Last 24 hours) at 8/4/2022 2153  Last data filed at 8/4/2022 1200  Gross per 24 hour   Intake 480 ml   Output 1200 ml   Net -720 ml        PHYSICAL EXAM:  General: WD, WN. Alert, cooperative, no acute distress    EENT:  EOMI. Anicteric sclerae. MMM  Resp:  CTA bilaterally, no wheezing or rales. No accessory muscle use  CV:  Regular  rhythm,  No edema  GI:  Soft, Non distended, Non tender. +Bowel sounds  Neurologic:  Alert and oriented X 3, normal speech, nonfocal.  No tremor / rigor  Psych:   Not anxious nor agitated  Skin:  No rashes. No jaundice    LABS:  I reviewed today's most current labs and imaging studies.   Pertinent labs include:  Recent Labs     08/04/22  0531 08/03/22  0759   WBC 7.8 10.3   HGB 11.9 12.6   HCT 36.8 38.9    183     Recent Labs     08/04/22  0531 08/03/22  0759    139   K 4.0 3.9    102   CO2 26 29   * 126*   BUN 16 16   CREA 1.37* 1.46*   CA 8.7 9.3 MG 2.2  --    ALB  --  3.4*   TBILI  --  0.7   ALT  --  29     CULTURES:  Paired BC 8/3:  NG x 1 day  Urine 8/3:  NSG  ,   1,000 col/ml     8/3/22 08:46   Influenza A by PCR Not detected   Influenza B by PCR Not detected   SARS-COV-2  PCR Not detected       EKG: NSR 91 bpm, LAD, LBBB, NSC     RADIOLOGY  pCXR 8/3:  A portable AP radiograph of the chest was obtained at the 12 hours. The patient is on a cardiac monitor. The lungs are clear. Cardiac silhouette  is stable. .  Bony structures are unchanged. IMPRESSION: No acute abnormality       Procedures: see electronic medical records for all procedures/Xrays and details which were not copied into this note but were reviewed prior to creation of Plan. Assessment / Plan:    Principal Problem:    Complicated UTI (urinary tract infection) (9/12/2020)  Active Problems:    Incontinence of urine in female (9/13/2020)  Patient presenting with fever and weakness.   Meets 2 of 4 SIRS criteria with temperature and tachycardia  Blood and urine cultures neg to date  Has been chronically on Nitrofurantoin suppressive tx - recently prescribed Septra over the past 5 days  Patient given Rocephin 1 g in the ED, 1500 cc IV normal saline  Continue treatment and monitor results pending       SIRS (systemic inflammatory response syndrome) ([address] (8/3/2022)    Weakness generalized (8/3/2022)  Functional decline over the past couple weeks  Treat the underlying acute illness  PT OT for functional rehab  D/c home when back to baseline  Clear stability / independence with PT/OT before discharge       Chronic deep vein thrombosis (DVT) of femoral vein of both lower extremities ([address] (9/13/2020)    Venous stasis of both lower extremities (9/13/2020)  Maintain Eliquis 5 mg twice daily       Acquired hypothyroidism (9/13/2020)  Maintain Levothyroxine 100 mcg daily  Follow-up TSH  Last TSH September 2021 1.28 /  Feb 2022 2.00       Essential hypertension (9/13/2020)    Stage IIib CKD  Monitor on current treatment with Cardizem  mg daily. Holding Lasix and HCTZ. Dr. 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 He recommended Nitrofurantoin be switched to Trimethoprim or SS Bactrim  Suspect patient should be discharge w/o suppressive tx,  Consider Vit C 500mg bid     Moises Wright MD    [place], [address] [place] [address]    [phone] (Work)    [phone] (Fax)             SAFETY:   Code Status:Full  DVT prophylaxis:Eliquis  Stress Ulcer prophylaxis: Pepcid  Bladder catheter:no  Family Contact Info:  Primary Emergency Contact: RosalioKaren, Home Phone: [phone]  Bedded: [facility] Room 116/01  Disposition: TBD, likely home when stable  Admission status:  Inpatient    Reviewed most current lab test results and cultures  YES  Reviewed most current radiology test results   YES  Review and summation of old records today    NO  Reviewed patient's current orders and MAR    YES  PMH/SH reviewed - no change compared to H&P    Care Plan discussed with:                                   Comments  Patient x     Family      RN x     Care Manager       Consultant   x                       x Multidiciplinary team rounds were held today with , nursing, pharmacist and clinical coordinator. Patient's plan of care was discussed; medications were reviewed and discharge planning was addressed.         ____________________________________________    Total NON Critical Care TIME:  35   Minutes        Comments   >50% of visit spent in counseling and coordination of care   x      Signed: Xaiver Dueñas MD  [facility] Hospitalist  [phone]

## 2022-08-05 NOTE — PROGRESS NOTES
PHYSICAL THERAPY TREATMENT  Patient: Meenu Carpio (86 y.o. female)  Date: 8/5/2022  Diagnosis: UTI (urinary tract infection) [F07.8] Complicated UTI (urinary tract infection)      Precautions: Fall  Chart, physical therapy assessment, plan of care and goals were reviewed. ASSESSMENT  Patient continues with skilled PT services and is progressing towards goals. Patient seen this am with a cheery disposition although with complaints of being tired. Upon more discussion patient states tiredness is due to switching of IV sites, and having to have a bowel movement this am.   Patient was agreeable to therapy this am and demonstrated transfer training and gait training to further promote increased safety and mobility. Patient noted to require additional time to perform transitional movements however was SBA for transfers utilizing Rolling walker, and SBA/CGA with Rolling walker for gait training. Patient ambulated 20'x1, 35'x1 and followed up with a 75' session in the hallway with rolling walker and SBA/CGA for safety. Patient educated throughout on safety techniques. Patient verbalized understanding and also returned demonstration of safety using AD and posture/positioning. Patient would continue to do well with Home health services and family/caregiver assist at home. Patient is motivated to continue to improve with mobility. Current Level of Function Impacting Discharge (mobility/balance): good with support    Other factors to consider for discharge: family/caregiver assist/supportive family. PLAN :  Patient continues to benefit from skilled intervention to address the above impairments. Continue treatment per established plan of care. to address goals. Recommendation for discharge: (in order for the patient to meet his/her long term goals)  Physical therapy at least 2 days/week in the home AND ensure assist and/or supervision for safety with functional mobility and ADL tasks.     This discharge recommendation:  Has been made in collaboration with the attending provider and/or case management    IF patient discharges home will need the following DME: rolling walker       SUBJECTIVE:   Patient stated I just feel tired today.     OBJECTIVE DATA SUMMARY:   Critical Behavior:  Neurologic State: Alert  Orientation Level: Oriented X4  Cognition: Follows commands  Safety/Judgement: Awareness of environment, Fall prevention, Good awareness of safety precautions  Functional Mobility Training:  Bed Mobility:                    Transfers:  Sit to Stand: Stand-by assistance  Stand to Sit: Stand-by assistance        Bed to Chair: Stand-by assistance                    Balance:  Sitting: Intact  Standing: Impaired; Without support  Standing - Static: Constant support;Good  Standing - Dynamic : Constant support; Fair  Ambulation/Gait Training:  Distance (ft): 75 Feet (ft)  Assistive Device: Walker, rolling;Gait belt  Ambulation - Level of Assistance: Stand-by assistance;Contact guard assistance        Gait Abnormalities: Decreased step clearance (cues for AD placement with return demonstration of cues)     Left Side Weight Bearing: Full  Base of Support: Widened     Speed/Michelle: Slow  Step Length: Right shortened;Left shortened      Problem: Mobility Impaired (Adult and Pediatric)  Goal: *Acute Goals and Plan of Care (Insert Text)  Description: FUNCTIONAL STATUS PRIOR TO ADMISSION: Patient was modified independent using a rollator for functional mobility. HOME SUPPORT PRIOR TO ADMISSION: The patient lived alone with caregiver 4-5x/week, 4 hours/day to provide assistance with IADLs and driving activities. Physical Therapy Goals  Initiated 8/4/2022  1. Patient will move from supine to sit and sit to supine  in bed with independence within 7 day(s). 2.  Patient will transfer from bed to chair and chair to bed with modified independence using the least restrictive device within 7 day(s).   3.  Patient will perform sit to stand with modified independence within 7 day(s). 4.  Patient will ambulate with modified independence for 150 feet with the least restrictive device within 7 day(s). Outcome: Progressing Towards Goal       Activity Tolerance:   Good    After treatment patient left in no apparent distress:   Sitting in chair, Call bell within reach, and Bed / chair alarm activated    COMMUNICATION/COLLABORATION:   The patients plan of care was discussed with: Physician and Case management.      Judy De Luna PTA   Time Calculation: 23 mins

## 2022-08-05 NOTE — PROGRESS NOTES
Spoke with patient about disposition needs at discharge if she is discharged over the weekend. Patient status she has a walker, shower chair, and other DME at home. PT recommended that she get a walker but according to the patient, she has a walker. Follow up PCP Appointment;  Patient states she has a follow up appointment already scheduled with her PCP for next week but could not remember the date or time.

## 2022-08-05 NOTE — PROGRESS NOTES
[facility]  Hospitalist Progress Note    NAME: Mathieu Rivas   :  1935   MRN:  [id number]     Total duration of encounter: 2 days              Subjective:     Chief Complaint / Reason for Physician Visit  \" Weakness, patient complaining of mouth pain today\".   Discussed with RN       Review of Systems:  Symptom Y/N Comments  Symptom Y/N Comments   Fever/Chills    Chest Pain     Poor Appetite    Edema     Cough    Abdominal Pain     Sputum    Joint Pain     SOB/VELASCO    Pruritis/Rash     Nausea/vomit    Tolerating PT/OT     Diarrhea    Tolerating Diet     Constipation    Other         Current Facility-Administered Medications:     sodium chloride (NS) flush 5-10 mL, 5-10 mL, IntraVENous, PRN, Juan Carlos Lewis MD    cefTRIAXone (ROCEPHIN) 1 g in 0.9% sodium chloride (MBP/ADV) 50 mL MBP, 1 g, IntraVENous, Q24H, Juan Carlos Lewis MD, IV Completed at 22 1001    acetaminophen (TYLENOL) tablet 650 mg, 650 mg, Oral, Q6H PRN, Juan Carlos Lewis MD, 650 mg at 22 0858    ondansetron [facility] injection 4 mg, 4 mg, IntraVENous, Q4H PRN, Juan Carlos Lewis MD    [facility]) tablet 5 mg, 5 mg, Oral, BID, Juan Carlos Lewis MD, 5 mg at 22 0850    dilTIAZem ER (CARDIZEM CD) capsule 240 mg, 240 mg, Oral, DAILY, Juan Carlos Lewis MD, 240 mg at 22 0849    furosemide (LASIX) tablet 20 mg, 20 mg, Oral, DAILY PRN, Juan Carlos Lewis MD    levothyroxine (SYNTHROID) tablet 100 mcg, 100 mcg, Oral, ACB, Juan Carlos Lewis MD, 100 mcg at 22 0636    trospium (SANCTURA) tablet 20 mg, 20 mg, Oral, DAILY, Juan Carlos Lewis MD, 20 mg at 22 7090    vitamin G-H-I-lutein-minerals (OCUVITE) tablet 1 Tablet, 1 Tablet, Oral, BID, Juan Carlos Lewis MD, 1 Tablet at 22 0849    sodium chloride (NS) flush 5-40 mL, 5-40 mL, IntraVENous, Q8H, Sylwia LY MD, 10 mL at 22 0636    sodium chloride (NS) flush 5-40 mL, 5-40 mL, IntraVENous, PRN, Juan Carlos Lewis MD    polyethylene glycol (MIRALAX) packet 17 g, 17 g, Oral, DAILY PRN, Booker Lemus MD    famotidine (PEPCID) tablet 20 mg, 20 mg, Oral, QPM, Booker Lemus MD, 20 mg at 08/04/22 1722    Objective:     VITALS:   Patient Vitals for the past 12 hrs:   Temp Pulse Resp BP SpO2   08/05/22 0859 99.3 °F (37.4 °C) -- -- -- --   08/05/22 0729 98.6 °F (37 °C) 70 20 139/66 96 %       Intake/Output Summary (Last 24 hours) at 8/5/2022 1322  Last data filed at 8/5/2022 1200  Gross per 24 hour   Intake 480 ml   Output 3825 ml   Net -3345 ml        PHYSICAL EXAM:  General: WD, WN. Alert, cooperative, no acute distress    EENT:  EOMI. Anicteric sclerae. MMM  Resp:  CTA bilaterally, no wheezing or rales. No accessory muscle use  CV:  Regular  rhythm,  No edema  GI:  Soft, Non distended, Non tender. +Bowel sounds  Neurologic:  Alert and oriented X 3, normal speech,   Psych:   Good insight. Not anxious nor agitated  Skin:  No rashes. No jaundice    LABS:  I reviewed today's most current labs and imaging studies. Pertinent labs include:  Recent Labs     08/04/22  0531 08/03/22  0759   WBC 7.8 10.3   HGB 11.9 12.6   HCT 36.8 38.9    183     Recent Labs     08/05/22  0553 08/04/22  0531 08/03/22  0759    138 139   K 4.0 4.0 3.9    105 102   CO2 26 26 29   * 102* 126*   BUN 19 16 16   CREA 1.31* 1.37* 1.46*   CA 9.0 8.7 9.3   MG  --  2.2  --    ALB  --   --  3.4*   TBILI  --   --  0.7   ALT  --   --  29       RADIOLOGY:  [unfilled]      Procedures: see electronic medical records for all procedures/Xrays and details which were not copied into this note but were reviewed prior to creation of Plan.         Assessment / Plan:    Principal Problem:    Complicated UTI (urinary tract infection) (9/12/2020)    Active Problems:    Incontinence of urine in female (9/13/2020)      Acquired hypothyroidism (9/13/2020)      Essential hypertension (9/13/2020)      Chronic deep vein thrombosis (DVT) of femoral vein of both lower extremities ([facility] (9/13/2020)      Venous stasis of both lower extremities (9/13/2020)      SIRS (systemic inflammatory response syndrome) ([facility] (8/3/2022)      Weakness generalized (6/7/7299)    Complicated UTI (urinary tract infection) (9/12/2020)  Active Problems:    Incontinence of urine in female (9/13/2020)  Patient presenting with fever and weakness SIRS, no leukocytosis  Urine with gram-negative rods  Has been chronically on Nitrofurantoin suppressive tx - recently prescribed Septra over the past 5 days  Plan:  Continue Rocephin day #3  Await finalization of urine culture  Patient's primary complaint is her weakness, she is discharge ready from a medical standpoint and could be transition to oral antibiotics    Dental infection  Continue Rocephin, follow-up with dental as an outpatient      SIRS (systemic inflammatory response syndrome) ([facility] [facility] (8/3/2022)    Weakness generalized (8/3/2022)  Functional decline over the past couple weeks, has resided at home, she would like to go back home, she does not feel ready based on her weakness. Continue physical therapy, medically she seems ready for discharge       Chronic deep vein thrombosis (DVT) of femoral vein of both lower extremities ([address] (9/13/2020)    Venous stasis of both lower extremities (9/13/2020)  Maintain Eliquis 5 mg twice daily       Acquired hypothyroidism (9/13/2020)  Maintain Levothyroxine 100 mcg daily, TSH normal on this admission       Essential hypertension (9/13/2020)    Stage IIib CKD  Monitor on current treatment with Cardizem  mg daily. Holding Lasix and HCTZ. Dr. 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   He recommended Nitrofurantoin be switched to Trimethoprim or SS Bactrim  Suspect patient should be discharge w/o suppressive tx,  Consider Vit C 500mg bid      ______________________________________________________________________  SAFETY:   Code Status:Full  DVT prophylaxis:Eliquis  Stress Ulcer prophylaxis: Pepcid  Bladder catheter:no  Family Contact Info:  Primary Emergency Contact: Karen Santamaria, Home Phone: [phone]  Bedded: [facility] Room 116/01  Disposition: TBD, likely home when stable  Admission status:  Inpatient    Reviewed most current lab test results and cultures  YES  Reviewed most current radiology test results   YES  Review and summation of old records today    NO  Reviewed patient's current orders and MAR    YES  PMH/ reviewed - no change compared to H&P    Care Plan discussed with:                                   Comments  Patient x     Family  x brother / guardian - Chemo Mcfarlane RN x     Care Manager       Consultant                           Multidiciplinary team rounds were held today with , nursing, pharmacist and clinical coordinator. Patient's plan of care was discussed; medications were reviewed and discharge planning was addressed.         ____________________________________________    Total NON Critical Care TIME:  25   Minutes        Comments   >50% of visit spent in counseling and coordination of care       Signed: Ghislaine Raines MD  [facility] Hospitalist  [phone]

## 2022-08-05 NOTE — PROGRESS NOTES
IDR Team; MD, Nursing, Care Manager, Physical therapy, Nursing Supervisor and Pharmacy  met to review patient's plan of care. Discussed goals, interventions, barriers and progress. Team will continue to monitor progress and report any concerns to the physician and care management as indicated. Transition of Care Plan: Per RN, patient has history of UTI. Peeing fine. Gram - rods in urine. On rocephin. Patient complaining of dental pain 10/10. Patient up In chair now. Likes to stay in bed. PT said she hasn't worked with patient yet but will give dc recommendations to CM when she does. 1100: Spoke with PT. She said that patient did well with therapy. Walked snf down the tom. Recommends home w/home health. CM will speak with patient to see which home health agency she would like. PT also mentioned that patient said her daughter would be there tomorrow. Maybe dc tomorrow if medically stable.

## 2022-08-06 PROCEDURE — 74011250637 HC RX REV CODE- 250/637: Performed by: FAMILY MEDICINE

## 2022-08-06 PROCEDURE — 74011250636 HC RX REV CODE- 250/636: Performed by: INTERNAL MEDICINE

## 2022-08-06 PROCEDURE — 97530 THERAPEUTIC ACTIVITIES: CPT

## 2022-08-06 PROCEDURE — 74011000258 HC RX REV CODE- 258: Performed by: INTERNAL MEDICINE

## 2022-08-06 PROCEDURE — 74011000250 HC RX REV CODE- 250: Performed by: INTERNAL MEDICINE

## 2022-08-06 PROCEDURE — 65270000029 HC RM PRIVATE

## 2022-08-06 PROCEDURE — 74011250637 HC RX REV CODE- 250/637: Performed by: INTERNAL MEDICINE

## 2022-08-06 PROCEDURE — 97116 GAIT TRAINING THERAPY: CPT

## 2022-08-06 PROCEDURE — 77030038269 HC DRN EXT URIN PURWCK BARD -A

## 2022-08-06 RX ORDER — CEPHALEXIN 250 MG/1
500 CAPSULE ORAL 2 TIMES DAILY
Status: DISCONTINUED | OUTPATIENT
Start: 2022-08-06 | End: 2022-08-07 | Stop reason: HOSPADM

## 2022-08-06 RX ADMIN — APIXABAN 5 MG: 5 TABLET, FILM COATED ORAL at 18:04

## 2022-08-06 RX ADMIN — I-VITE, TAB 1000-60-2MG (60/BT) 1 TABLET: TAB at 10:55

## 2022-08-06 RX ADMIN — CEFTRIAXONE SODIUM 1 G: 1 INJECTION, POWDER, FOR SOLUTION INTRAMUSCULAR; INTRAVENOUS at 10:55

## 2022-08-06 RX ADMIN — APIXABAN 5 MG: 5 TABLET, FILM COATED ORAL at 10:55

## 2022-08-06 RX ADMIN — TROSPIUM CHLORIDE 20 MG: 20 TABLET, FILM COATED ORAL at 10:55

## 2022-08-06 RX ADMIN — SODIUM CHLORIDE, PRESERVATIVE FREE 10 ML: 5 INJECTION INTRAVENOUS at 20:50

## 2022-08-06 RX ADMIN — SODIUM CHLORIDE, PRESERVATIVE FREE 10 ML: 5 INJECTION INTRAVENOUS at 18:04

## 2022-08-06 RX ADMIN — I-VITE, TAB 1000-60-2MG (60/BT) 1 TABLET: TAB at 18:04

## 2022-08-06 RX ADMIN — CEPHALEXIN 500 MG: 250 CAPSULE ORAL at 18:04

## 2022-08-06 RX ADMIN — SODIUM CHLORIDE, PRESERVATIVE FREE 10 ML: 5 INJECTION INTRAVENOUS at 06:47

## 2022-08-06 RX ADMIN — DILTIAZEM HYDROCHLORIDE 240 MG: 240 CAPSULE, COATED, EXTENDED RELEASE ORAL at 10:55

## 2022-08-06 RX ADMIN — SODIUM CHLORIDE, PRESERVATIVE FREE 10 ML: 5 INJECTION INTRAVENOUS at 06:48

## 2022-08-06 RX ADMIN — FAMOTIDINE 20 MG: 20 TABLET, FILM COATED ORAL at 18:04

## 2022-08-06 RX ADMIN — LEVOTHYROXINE SODIUM 100 MCG: 0.1 TABLET ORAL at 06:45

## 2022-08-06 NOTE — PROGRESS NOTES
Problem: Falls - Risk of  Goal: *Absence of Falls  Description: Document Quentin Pfeiffer Fall Risk and appropriate interventions in the flowsheet.   Outcome: Progressing Towards Goal  Note: Fall Risk Interventions:  Mobility Interventions: Utilize walker, cane, or other assistive device         Medication Interventions: Teach patient to arise slowly    Elimination Interventions: Call light in reach, Bed/chair exit alarm    History of Falls Interventions: Bed/chair exit alarm

## 2022-08-06 NOTE — PROGRESS NOTES
PHYSICAL THERAPY TREATMENT  Patient: Reina Bowman (83 y.o. female)  Date: 8/6/2022  Diagnosis: UTI (urinary tract infection) [O61.3] Complicated UTI (urinary tract infection)      Precautions: Fall  Chart, physical therapy assessment, plan of care and goals were reviewed. ASSESSMENT  Patient continues with skilled PT services and is progressing towards goals. Patient very excited to be going home tomorrow. Patient agreeable to ambulating to the rest room to don a brief prior to ambulating in the hallway. Patient did well performing sit to stands from multiple surfaces as well as standing to don brief. Patient was unable to don a standard brief independently however she stood with no LOB during actvitiy. Patient then ambulated 76' in the hallway using rolling walker and SBA for safety. Patient without LOB or deviation outside VISH that required correcting. Patient however continued to ambulate too far behind assistive device and requires max cues to improve posture/positioning for increased safety and decreased fall risk. Current Level of Function Impacting Discharge (mobility/balance): fair/good with support    Other factors to consider for discharge: family support/ aid support         PLAN :  Patient continues to benefit from skilled intervention to address the above impairments. Continue treatment per established plan of care. to address goals. Recommendation for discharge: (in order for the patient to meet his/her long term goals)  Physical therapy at least 2 days/week in the home AND ensure assist and/or supervision for safety with ADL care and all mobility at this time. This discharge recommendation:  Has been made in collaboration with the attending provider and/or case management    IF patient discharges home will need the following DME: patient owns DME required for discharge       SUBJECTIVE:   Patient stated I think I am ready to go home. I am excited.     OBJECTIVE DATA SUMMARY: Critical Behavior:  Neurologic State: Alert  Orientation Level: Oriented X4  Cognition: Follows commands, Appropriate decision making  Safety/Judgement: Awareness of environment, Fall prevention, Good awareness of safety precautions  Functional Mobility Training:  Bed Mobility:  Rolling: Supervision  Supine to Sit: Supervision              Transfers:  Sit to Stand: Stand-by assistance  Stand to Sit: Stand-by assistance        Bed to Chair: Stand-by assistance                    Balance:  Sitting: Intact  Standing: Impaired; Without support  Standing - Static: Constant support;Good  Standing - Dynamic : Constant support; Fair  Ambulation/Gait Training:  Distance (ft): 75 Feet (ft)  Assistive Device: Walker, rolling;Gait belt  Ambulation - Level of Assistance: Stand-by assistance        Gait Abnormalities: Decreased step clearance        Base of Support: Widened     Speed/Michelle: Slow  Step Length: Right shortened;Left shortened        Problem: Mobility Impaired (Adult and Pediatric)  Goal: *Acute Goals and Plan of Care (Insert Text)  Description: FUNCTIONAL STATUS PRIOR TO ADMISSION: Patient was modified independent using a rollator for functional mobility. HOME SUPPORT PRIOR TO ADMISSION: The patient lived alone with caregiver 4-5x/week, 4 hours/day to provide assistance with IADLs and driving activities. Physical Therapy Goals  Initiated 8/4/2022  1. Patient will move from supine to sit and sit to supine  in bed with independence within 7 day(s). 2.  Patient will transfer from bed to chair and chair to bed with modified independence using the least restrictive device within 7 day(s). 3.  Patient will perform sit to stand with modified independence within 7 day(s). 4.  Patient will ambulate with modified independence for 150 feet with the least restrictive device within 7 day(s).      Outcome: Progressing Towards Goal    Activity Tolerance:   Fair    After treatment patient left in no apparent distress: Supine in bed, Heels elevated for pressure relief, Call bell within reach, and Bed / chair alarm activated    COMMUNICATION/COLLABORATION:   The patients plan of care was discussed with: Physical therapist, Registered nurse, Physician, and Case management.      Tequila Harding PTA   Time Calculation: 38 mins

## 2022-08-06 NOTE — PROGRESS NOTES
[facility]  Hospitalist Progress Note    NAME: Kalyn Graves   :  1935   MRN:  [id number]     Total duration of encounter: 3 days              Subjective:     Chief Complaint / Reason for Physician Visit  Weakness. Patient states overall she is feeling well today.   Her mouth is not bothering her as much    Review of Systems:  Symptom Y/N Comments  Symptom Y/N Comments   Fever/Chills    Chest Pain     Poor Appetite    Edema     Cough    Abdominal Pain     Sputum    Joint Pain     SOB/VELASCO    Pruritis/Rash     Nausea/vomit    Tolerating PT/OT     Diarrhea    Tolerating Diet     Constipation    Other         Current Facility-Administered Medications:     sodium chloride (NS) flush 5-10 mL, 5-10 mL, IntraVENous, PRN, Jose Ferraro MD    cefTRIAXone (ROCEPHIN) 1 g in 0.9% sodium chloride (MBP/ADV) 50 mL MBP, 1 g, IntraVENous, Q24H, Jose Ferraro MD, IV Completed at 22 1001    acetaminophen (TYLENOL) tablet 650 mg, 650 mg, Oral, Q6H PRN, Jose Ferraro MD, 650 mg at 22 1810    ondansetron (ZOFRAN) injection 4 mg, 4 mg, IntraVENous, Q4H PRN, Jose Ferraro MD    apixaban Rutha Share) tablet 5 mg, 5 mg, Oral, BID, Jose Ferraro MD, 5 mg at 22 1744    dilTIAZem ER (CARDIZEM CD) capsule 240 mg, 240 mg, Oral, DAILY, Jose Ferraro MD, 240 mg at 22 0849    furosemide (LASIX) tablet 20 mg, 20 mg, Oral, DAILY PRN, Jose Ferraro MD    levothyroxine (SYNTHROID) tablet 100 mcg, 100 mcg, Oral, ACB, Jose Ferraro MD, 100 mcg at 22 0645    trospium (SANCTURA) tablet 20 mg, 20 mg, Oral, DAILY, Jose Ferraro MD, 20 mg at 22 6454    vitamin P-S-S-lutein-minerals (OCUVITE) tablet 1 Tablet, 1 Tablet, Oral, BID, Jose Ferraro MD, 1 Tablet at 22 1744    sodium chloride (NS) flush 5-40 mL, 5-40 mL, IntraVENous, Q8H, Jose Ferraro MD, 10 mL at 22 0648    sodium chloride (NS) flush 5-40 mL, 5-40 mL, IntraVENous, PRN, Jose Ferraro MD    polyethylene glycol (MIRALAX) packet 17 g, 17 g, Oral, DAILY PRN, Viviana Gonzalez MD    famotidine (PEPCID) tablet 20 mg, 20 mg, Oral, QPM, Viviana Gonzalez MD, 20 mg at 08/05/22 1744    Objective:     VITALS:   Patient Vitals for the past 12 hrs:   Temp Pulse Resp BP SpO2   08/06/22 0737 97.7 °F (36.5 °C) (!) 54 20 (!) 153/68 96 %   08/05/22 2315 98.1 °F (36.7 °C) 61 20 (!) 157/70 96 %       Intake/Output Summary (Last 24 hours) at 8/6/2022 1044  Last data filed at 8/6/2022 0516  Gross per 24 hour   Intake 240 ml   Output 1400 ml   Net -1160 ml        PHYSICAL EXAM:  General: WD, WN. Alert, cooperative, no acute distress    EENT:  EOMI. Anicteric sclerae. MMM  Resp:  CTA bilaterally, no wheezing or rales. No accessory muscle use  CV:  Regular  rhythm,  No edema  GI:  Soft, Non distended, Non tender. +Bowel sounds  Neurologic:  Alert and oriented X 3, normal speech,   Psych:   Good insight. Not anxious nor agitated  Skin:  No rashes. No jaundice    LABS:  I reviewed today's most current labs and imaging studies. Pertinent labs include:  Recent Labs     08/04/22  0531   WBC 7.8   HGB 11.9   HCT 36.8        Recent Labs     08/05/22  0553 08/04/22  0531    138   K 4.0 4.0    105   CO2 26 26   * 102*   BUN 19 16   CREA 1.31* 1.37*   CA 9.0 8.7   MG  --  2.2       RADIOLOGY:  [unfilled]      Procedures: see electronic medical records for all procedures/Xrays and details which were not copied into this note but were reviewed prior to creation of Plan.         Assessment / Plan:    Principal Problem:    Complicated UTI (urinary tract infection) (9/12/2020)    Active Problems:    Incontinence of urine in female (9/13/2020)      Acquired hypothyroidism (9/13/2020)      Essential hypertension (9/13/2020)      Chronic deep vein thrombosis (DVT) of femoral vein of both lower extremities ([address] (9/13/2020)      Venous stasis of both lower extremities (9/13/2020)      SIRS (systemic inflammatory response syndrome) ([facility] (8/3/2022)      Weakness generalized (5/1/2806)    Complicated UTI (urinary tract infection) (9/12/2020)  Active Problems:    Incontinence of urine in female (9/13/2020)  Patient presenting with fever and weakness SIRS, no leukocytosis  Urine with gram-negative rods less than 1000 colonies  Has been chronically on Nitrofurantoin suppressive tx - recently prescribed Septra over the past 5 days  Plan:  Rocephin day #4, transition to [address] infection  Continue antibiotics, follow-up with dental as an outpatient       SIRS (systemic inflammatory response syndrome) ([facility] [address] (8/3/2022)    Weakness generalized (8/3/2022)  Functional decline over the past couple weeks, has resided at home, she would like to go back home, she does not feel ready based on her weakness. Continue physical therapy, plan is home health physical therapy       Chronic deep vein thrombosis (DVT) of femoral vein of both lower extremities (H[facility]) (9/13/2020)    Venous stasis of both lower extremities (9/13/2020)  Maintain Eliquis 5 mg twice daily       Acquired hypothyroidism (9/13/2020)  Maintain Levothyroxine 100 mcg daily, TSH normal on this admission       Essential hypertension (9/13/2020)    Stage IIib CKD  Monitor on current treatment with Cardizem  mg daily. Holding Lasix and HCTZ. Dr. 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 He recommended Nitrofurantoin be switched to Trimethoprim or SS Bactrim  Suspect patient should be discharge w/o suppressive tx,  Consider Vit C 500mg bid    Urinary incontinence  Patient's primary complaint. She is interested in Botox injection in her bladder.   Advise follow-up with urology  Continue María Hong, would advise Rufus brown, urology evaluation, could consider Cymbalta if she also had depression    ______________________________________________________________________  SAFETY:   Code Status:Full  DVT prophylaxis:Eliquis  Stress Ulcer prophylaxis: Pepcid  Bladder catheter:no  Family Contact Info:  Primary Emergency Contact: Karen Santamaria, Home Phone: [phone]  Bedded: [facility] Room 116/01  Disposition: TBD, likely home when stable  Admission status:  Inpatient    Reviewed most current lab test results and cultures  YES  Reviewed most current radiology test results   YES  Review and summation of old records today    NO  Reviewed patient's current orders and MAR    YES  PMH/ reviewed - no change compared to H&P    Care Plan discussed with:                                   Comments  Patient x     Family  x brother / guardian - Kristopher Diaz RN x     Care Manager       Consultant                           Multidiciplinary team rounds were held today with , nursing, pharmacist and clinical coordinator. Patient's plan of care was discussed; medications were reviewed and discharge planning was addressed.         ____________________________________________    Total NON Critical Care TIME:  25   Minutes        Comments   >50% of visit spent in counseling and coordination of care x      Signed: Radha Mata MD  [facility] Hospitalist  [phone]

## 2022-08-06 NOTE — PROGRESS NOTES
Bedside shift change report given to Amy Malcolm 67  (oncoming nurse) by Irais Campos RN  (offgoing nurse). Report included the following information Kardex.

## 2022-08-06 NOTE — PROGRESS NOTES
Problem: Hypertension  Goal: *Fluid volume balance  Outcome: Progressing Towards Goal  Goal: *Labs within defined limits  Outcome: Progressing Towards Goal     Problem: Falls - Risk of  Goal: *Absence of Falls  Description: Document Reese Fall Risk and appropriate interventions in the flowsheet.   Outcome: Progressing Towards Goal  Note: Fall Risk Interventions:  Mobility Interventions: Utilize walker, cane, or other assistive device         Medication Interventions: Patient to call before getting OOB    Elimination Interventions: Call light in reach, Bed/chair exit alarm    History of Falls Interventions: Bed/chair exit alarm, Door open when patient unattended         Problem: Pain  Goal: *Control of Pain  Outcome: Progressing Towards Goal     Problem: General Medical Care Plan  Goal: *Vital signs within specified parameters  Outcome: Progressing Towards Goal  Goal: *Labs within defined limits  Outcome: Progressing Towards Goal  Goal: *Absence of infection signs and symptoms  Outcome: Progressing Towards Goal  Goal: *Fluid volume balance  Outcome: Progressing Towards Goal  Goal: *Anxiety reduced or absent  Outcome: Progressing Towards Goal

## 2022-08-07 VITALS
TEMPERATURE: 97.8 F | WEIGHT: 217.4 LBS | DIASTOLIC BLOOD PRESSURE: 69 MMHG | OXYGEN SATURATION: 97 % | SYSTOLIC BLOOD PRESSURE: 151 MMHG | RESPIRATION RATE: 20 BRPM | HEIGHT: 59 IN | BODY MASS INDEX: 43.83 KG/M2 | HEART RATE: 62 BPM

## 2022-08-07 PROBLEM — R65.10 SIRS (SYSTEMIC INFLAMMATORY RESPONSE SYNDROME) (HCC): Status: RESOLVED | Noted: 2022-08-03 | Resolved: 2022-08-07

## 2022-08-07 PROCEDURE — 77030038269 HC DRN EXT URIN PURWCK BARD -A

## 2022-08-07 PROCEDURE — 74011250637 HC RX REV CODE- 250/637: Performed by: FAMILY MEDICINE

## 2022-08-07 PROCEDURE — 74011000250 HC RX REV CODE- 250: Performed by: INTERNAL MEDICINE

## 2022-08-07 PROCEDURE — 74011250637 HC RX REV CODE- 250/637: Performed by: INTERNAL MEDICINE

## 2022-08-07 RX ORDER — CEPHALEXIN 500 MG/1
500 CAPSULE ORAL 2 TIMES DAILY
Qty: 8 CAPSULE | Refills: 0 | Status: SHIPPED | OUTPATIENT
Start: 2022-08-07 | End: 2022-08-11

## 2022-08-07 RX ADMIN — APIXABAN 5 MG: 5 TABLET, FILM COATED ORAL at 09:36

## 2022-08-07 RX ADMIN — SODIUM CHLORIDE, PRESERVATIVE FREE 10 ML: 5 INJECTION INTRAVENOUS at 06:37

## 2022-08-07 RX ADMIN — I-VITE, TAB 1000-60-2MG (60/BT) 1 TABLET: TAB at 09:36

## 2022-08-07 RX ADMIN — TROSPIUM CHLORIDE 20 MG: 20 TABLET, FILM COATED ORAL at 09:36

## 2022-08-07 RX ADMIN — DILTIAZEM HYDROCHLORIDE 240 MG: 240 CAPSULE, COATED, EXTENDED RELEASE ORAL at 09:36

## 2022-08-07 RX ADMIN — LEVOTHYROXINE SODIUM 100 MCG: 0.1 TABLET ORAL at 06:37

## 2022-08-07 RX ADMIN — CEPHALEXIN 500 MG: 250 CAPSULE ORAL at 09:35

## 2022-08-07 NOTE — DISCHARGE INSTRUCTIONS
Follow up with urology for concern of urinary incontinence, patients primary concern, consider botox, continue anticholinergic, start kegel exercises  Hold lasix for now  Complete antibiotic course  DISCHARGE SUMMARY from Nurse    PATIENT INSTRUCTIONS:    After general anesthesia or intravenous sedation, for 24 hours or while taking prescription Narcotics:  Limit your activities  Do not drive and operate hazardous machinery  Do not make important personal or business decisions  Do  not drink alcoholic beverages  If you have not urinated within 8 hours after discharge, please contact your surgeon on call. Report the following to your surgeon:  Excessive pain, swelling, redness or odor of or around the surgical area  Temperature over 100.5  Nausea and vomiting lasting longer than 4 hours or if unable to take medications  Any signs of decreased circulation or nerve impairment to extremity: change in color, persistent  numbness, tingling, coldness or increase pain  Any questions    What to do at Home:  Recommended activity: Activity as tolerated. If you experience any of the following symptoms pain with urination/fever, please follow up with PCP or ER. *  Please give a list of your current medications to your Primary Care Provider. *  Please update this list whenever your medications are discontinued, doses are      changed, or new medications (including over-the-counter products) are added. *  Please carry medication information at all times in case of emergency situations. These are general instructions for a healthy lifestyle:    No smoking/ No tobacco products/ Avoid exposure to second hand smoke  Surgeon General's Warning:  Quitting smoking now greatly reduces serious risk to your health.     Obesity, smoking, and sedentary lifestyle greatly increases your risk for illness    A healthy diet, regular physical exercise & weight monitoring are important for maintaining a healthy lifestyle    You may be retaining fluid if you have a history of heart failure or if you experience any of the following symptoms:  Weight gain of 3 pounds or more overnight or 5 pounds in a week, increased swelling in our hands or feet or shortness of breath while lying flat in bed. Please call your doctor as soon as you notice any of these symptoms; do not wait until your next office visit. The discharge information has been reviewed with the patient. The patient verbalized understanding. Discharge medications reviewed with the patient and appropriate educational materials and side effects teaching were provided.   ___________________________________________________________________________________________________________________________________

## 2022-08-07 NOTE — PROGRESS NOTES
Discharge instructions reviewed with pt  Personal belongings returned: yes  Home meds returned: yes  To front entrance via wheelchair  Discharged home with  daughter @ [id number]

## 2022-08-07 NOTE — PROGRESS NOTES
Bedside shift change report given to Tiana  (oncoming nurse) by Jeremy Anglin  (offgoing nurse). Report included the following information SBAR, Kardex, Intake/Output, MAR, Recent Results, and Med Rec Status.

## 2022-08-07 NOTE — DISCHARGE SUMMARY
[facility]  Hospitalist Discharge Summary    Patient ID:  Maria Teresa Ace  [id number]  81 y.o.  1935    PCP on record: Virginia Antonio NP    Admit date: 8/3/2022  Discharge date and time: 8/7/2022     DISCHARGE DIAGNOSIS:    Principal Problem:    Complicated UTI (urinary tract infection) (9/12/2020)    Active Problems:    Incontinence of urine in female (9/13/2020)      Acquired hypothyroidism (9/13/2020)      Essential hypertension (9/13/2020)      Chronic deep vein thrombosis (DVT) of femoral vein of both lower extremities ([address] (9/13/2020)      Venous stasis of both lower extremities (9/13/2020)      SIRS (systemic inflammatory response syndrome) ([address] (8/3/2022)      Weakness generalized (8/3/2022)  HPI: 81 y.o. female presenting for admission to [facility] for further evaluation and treatment for Complicated UTI (urinary tract infection). She  has a past medical history of Arthritis, Ill-defined condition, Thromboembolus ([address] and Urinary bladder incontinence. . She presents early this morning to the ED with complaints of generalized weakness. She has a history of stage III chronic kidney disease and history for recurrent UTIs and has done well taking suppressive therapy with Macrobid 100 mg daily. Local pharmacy could not refill her nitrofurantoin on Friday, July 29 and substituted Bactrim. Has done poorly that since that time with symptoms of tremor and weakness. She was not aware of an elevated temperature. Today she felt even weaker than prior and presented to the ED for assessment. Mated into the bathroom this morning but was unable to get herself back to bed.'s morning the home health aide presented at her scheduled time and was not able to get her out of the bathroom. EMS was called and she was brought to the ED. She normally ambulates independently with a cane or walker. No complaint for chest pain or shortness of breath.   She denied abdominal pain, nausea, vomiting, or diarrhea. There was no rash or skin lesion. Assessment in the ED was remarkable for her presenting temperature of 101.8 which responded to Tylenol. She met 2 of 4 SIRS criteria with her temperature and pulse, and lactic acid was normal.  Urinalysis showed some bacteria but no strong abnormalities suggesting infection. Blood culture was obtained from 2 sites. Chest x-ray was negative. The patient is admitted for a suspected complicated UTI developing on treatment and complicated by weakness and inability to walk. In the emergency department she received 1500 cc of normal saline as well as 1 g of Rocephin. Patient is followed for renal insufficiency by Dr. Quynh Gonzalez in [facility]. CONSULTATIONS:  None      Complicated UTI (urinary tract infection) (9/12/2020)  Active Problems:    Incontinence of urine in female (9/13/2020)  Patient presenting with fever and weakness SIRS, no leukocytosis  Urine with gram-negative rods less than 1000 colonies  Has been chronically on Nitrofurantoin suppressive tx - recently prescribed Septra over the past 5 days  Plan:  Treated with Rocephin x4 days, transitioned to Keflex for 4 additional days    Dental infection  Continue antibiotics, follow-up with dental as an outpatient       SIRS (systemic inflammatory response syndrome) ([facility] (8/3/2022)    Weakness generalized (8/3/2022)  Functional decline over the past couple weeks, has resided at home, she would like to go back home, she does not feel ready based on her weakness.   Continue physical therapy, plan is home health physical therapy       Chronic deep vein thrombosis (DVT) of femoral vein of both lower extremities (HCC) (9/13/2020)    Venous stasis of both lower extremities (9/13/2020)  Maintain Eliquis 5 mg twice daily       Acquired hypothyroidism (9/13/2020)  Maintain Levothyroxine 100 mcg daily, TSH normal on this admission       Essential hypertension (9/13/2020)    Stage IIib CKD  Monitor on current treatment with Cardizem  mg daily. Holding Lasix and HCTZ. Dr. 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 He recommended Nitrofurantoin be switched to Trimethoprim or SS Bactrim  Suspect patient should be discharge w/o suppressive tx,  Consider Vit C 500mg bid    Urinary incontinence  Patient's primary complaint. She is interested in Botox injection in her bladder. Advise follow-up with urology  Continue Jalen Church, advised the patient on Kegel exercises, urology evaluation, could consider Cymbalta if she also had depression. The patient is interested in Botox injection, she may be a candidate.    ______________________________________________________________________  DISCHARGE SUMMARY/HOSPITAL COURSE:  for full details see H&P, daily progress notes, labs, consult notes. Principal Problem:    Complicated UTI (urinary tract infection) (9/12/2020)    Active Problems:    Incontinence of urine in female (9/13/2020)      Acquired hypothyroidism (9/13/2020)      Essential hypertension (9/13/2020)      Chronic deep vein thrombosis (DVT) of femoral vein of both lower extremities ([address] (9/13/2020)      Venous stasis of both lower extremities (9/13/2020)      SIRS (systemic inflammatory response syndrome) ([address] (8/3/2022)      Weakness generalized (8/3/2022)          _______________________________________________________________________  Patient seen and examined by me on discharge day. Pertinent Findings:  Visit Vitals  BP (!) 151/69 (BP 1 Location: Left upper arm, BP Patient Position: At rest)   Pulse 62   Temp 97.8 °F (36.6 °C)   Resp 20   Ht 4' 11\" (1.499 m)   Wt 98.6 kg (217 lb 6.4 oz)   SpO2 97%   Breastfeeding No   BMI 43.91 kg/m²     Gen:    Not in distress  Chest: Nonlabored respiration, Clear lungs  CVS:   Regular rhythm.   No edema  Abd: Soft, not distended, not tender  Neuro:  Alert, nonfocal, weak  _______________________________________________________________________  DISCHARGE MEDICATIONS:   Current Discharge Medication List        START taking these medications    Details   cephALEXin (KEFLEX) 500 mg capsule Take 1 Capsule by mouth two (2) times a day for 4 days. Qty: 8 Capsule, Refills: 0  Start date: 8/7/2022, End date: 8/11/2022           CONTINUE these medications which have NOT CHANGED    Details   nystatin (MYCOSTATIN) powder Apply  to affected area. acetaminophen (TYLENOL) 650 mg TbER Take 650 mg by mouth every eight (8) hours as needed. Biotin 2,500 mcg cap Take 2,500 mcg by mouth daily. Qty: 1 Capsule, Refills: 0      mirabegron ER (MYRBETRIQ) 25 mg ER tablet Take 25 mg by mouth every evening. Indications: a condition where the urge to urinate results in urine leakage      apixaban (ELIQUIS) 5 mg tablet Take 5 mg by mouth two (2) times a day. Indications: blood clot in a deep vein of the extremities      vit a,c & e-lutein-minerals (OCUVITE) tablet 1 Tablet two (2) times a day. dilTIAZem ER (CARDIZEM LA) 240 mg Tb24 tablet Take 240 mg by mouth every evening. levothyroxine (SYNTHROID) 100 mcg tablet Take 100 mcg by mouth Daily (before breakfast). meclizine (ANTIVERT) 12.5 mg tablet Take 12.5 mg by mouth three (3) times daily as needed (vertigo).            STOP taking these medications       trimethoprim-sulfamethoxazole (BACTRIM, SEPTRA)  mg per tablet Comments:   Reason for Stopping:         furosemide (LASIX) 20 mg tablet Comments:   Reason for Stopping:               My Recommended  Diet: Regular  Activity: Ad Laila  Wound Care: none  Follow-up labs:     ______________________________________________________________________  DISPOSITION:    Home with Family:    Home with HH/PT/OT/RN: x   SNF/LTC:    [facility]:    OTHER:        Condition at Discharge: Stable  _____________________________________________________________________  Follow up with:   PCP : Donny Schulte NP  Follow-up Information    None             Total time in minutes spent coordinating this discharge (includes going over instructions, follow-up, prescriptions, and preparing report for sign off to her PCP) :35 minutes    Signed:  Jennifer Saunders MD  [facility] Hospitalist  [phone]

## 2022-08-08 NOTE — PROGRESS NOTES
Transition of Care ([facility]) Plan:  Patient discharged home with family. Initially she stated she did not want home health. Will call the patient to check on follow up appointment with her PCP. RUR; 10%    [facility] Transportation:      How is patient being transported at discharge? Family POV      When? 8/7/22     Is transport scheduled? NA    Follow-up appointment and transportation:     PCP? Juany Rojo NP     Specialist?     Time/Date? Will contact the patient about making follow up appointment and to check if she wants home health    Who is transporting to the follow-up appointment? Family     Is transport for follow up appointment scheduled? NA    Communication plan (with patient/family): Who is being called? Patient    What number(s) is to be used? [phone]    What service provider is calling for [facility] services? PcP Office    When are they calling? Probably 24 - 48 hours prior to appointment      Click here to [address] including selection of the Healthcare Decision Maker Relationship (ie \"Primary\")  @[email or web address]     Care Management Interventions  PCP Verified by CM: Yes Juany Rojo NP)  Last Visit to PCP: 02/08/22  Palliative Care Criteria Met (RRAT>21 & CHF Dx)?: No (No MD order for Palliative Care)  Mode of Transport at Discharge:  Other (see comment) (Family/ POV)  Transition of Care Consult (CM Consult): Home Health, Discharge Planning  MyChart Signup: No  Discharge Durable Medical Equipment: No  Physical Therapy Consult: Yes  Occupational Therapy Consult: Yes  Speech Therapy Consult: No  Support Systems: Other Family Member(s)  Confirm Follow Up Transport: Family  The Plan for Transition of Care is Related to the Following Treatment Goals : Treat UTI  [employer] Provided?: No  Discharge Location  Patient Expects to be Discharged to[de-identified] Home (Before patient was discharged, she stated that she didn't need home health Will contact her to see if she has changed her mind for home health services)

## 2022-08-10 NOTE — PROGRESS NOTES
TELEPHONE CALL 8/10/22 1055am    Patient stated that she was still a little weak and agreed to have home health services for PT/OT and skilled nursing. She stated that she had an appointment to see her PCP, Gildardo Duane, NP on Tuesday, 8/9/22, but she did not keep that appointment because she could not get there. On Monday, she made and had an appointment with her dentist because she lost a tooth. She called [facility]'s office to make another appointment but was told that they knew she was in the hospital and that she could make an appointment with them in a couple of weeks. Will contact [facility] to set up home health services for the patient with the patient's permission.

## 2022-09-06 PROBLEM — N39.0 UTI (URINARY TRACT INFECTION): Status: RESOLVED | Noted: 2020-09-13 | Resolved: 2022-09-06

## 2024-10-07 ENCOUNTER — HOSPITAL ENCOUNTER (OUTPATIENT)
Facility: HOSPITAL | Age: 89
Discharge: HOME | End: 2024-10-10
Payer: MEDICARE

## 2024-10-07 LAB
BASOPHILS # BLD: 0.1 K/UL (ref 0–0.1)
BASOPHILS NFR BLD: 1 % (ref 0–1)
DIFFERENTIAL METHOD BLD: ABNORMAL
EOSINOPHIL # BLD: 0.1 K/UL (ref 0–0.4)
EOSINOPHIL NFR BLD: 2 % (ref 0–7)
ERYTHROCYTE [DISTWIDTH] IN BLOOD BY AUTOMATED COUNT: 14.7 % (ref 11.5–14.5)
HCT VFR BLD AUTO: 42.4 % (ref 35–47)
HGB BLD-MCNC: 13.6 G/DL (ref 11.5–16)
IMM GRANULOCYTES # BLD AUTO: 0 K/UL (ref 0–0.04)
IMM GRANULOCYTES NFR BLD AUTO: 1 % (ref 0–0.5)
LYMPHOCYTES # BLD: 1.8 K/UL (ref 0.8–3.5)
LYMPHOCYTES NFR BLD: 21 % (ref 12–49)
MCH RBC QN AUTO: 29 PG (ref 26–34)
MCHC RBC AUTO-ENTMCNC: 32.1 G/DL (ref 30–36.5)
MCV RBC AUTO: 90.4 FL (ref 80–99)
MONOCYTES # BLD: 0.6 K/UL (ref 0–1)
MONOCYTES NFR BLD: 7 % (ref 5–13)
NEUTS SEG # BLD: 5.9 K/UL (ref 1.8–8)
NEUTS SEG NFR BLD: 68 % (ref 32–75)
NRBC # BLD: 0 K/UL (ref 0–0.01)
NRBC BLD-RTO: 0 PER 100 WBC
PLATELET # BLD AUTO: 272 K/UL (ref 150–400)
PMV BLD AUTO: 8.3 FL (ref 8.9–12.9)
RBC # BLD AUTO: 4.69 M/UL (ref 3.8–5.2)
WBC # BLD AUTO: 8.5 K/UL (ref 3.6–11)

## 2024-10-07 PROCEDURE — 85025 COMPLETE CBC W/AUTO DIFF WBC: CPT

## 2024-10-07 PROCEDURE — 36415 COLL VENOUS BLD VENIPUNCTURE: CPT

## 2024-10-08 LAB — PERIPHERAL SMEAR, MD REVIEW: NORMAL

## 2024-10-29 ENCOUNTER — HOSPITAL ENCOUNTER (OUTPATIENT)
Facility: HOSPITAL | Age: 89
Discharge: HOME | End: 2024-11-01
Payer: MEDICARE

## 2024-10-29 LAB
BASOPHILS # BLD: 0.1 K/UL (ref 0–0.1)
BASOPHILS NFR BLD: 1 % (ref 0–1)
DIFFERENTIAL METHOD BLD: ABNORMAL
EOSINOPHIL # BLD: 0.2 K/UL (ref 0–0.4)
EOSINOPHIL NFR BLD: 2 % (ref 0–7)
ERYTHROCYTE [DISTWIDTH] IN BLOOD BY AUTOMATED COUNT: 14.5 % (ref 11.5–14.5)
HCT VFR BLD AUTO: 44.1 % (ref 35–47)
HGB BLD-MCNC: 14 G/DL (ref 11.5–16)
IMM GRANULOCYTES # BLD AUTO: 0 K/UL (ref 0–0.04)
IMM GRANULOCYTES NFR BLD AUTO: 0 % (ref 0–0.5)
LYMPHOCYTES # BLD: 1.6 K/UL (ref 0.8–3.5)
LYMPHOCYTES NFR BLD: 16 % (ref 12–49)
MCH RBC QN AUTO: 29 PG (ref 26–34)
MCHC RBC AUTO-ENTMCNC: 31.7 G/DL (ref 30–36.5)
MCV RBC AUTO: 91.5 FL (ref 80–99)
MONOCYTES # BLD: 0.8 K/UL (ref 0–1)
MONOCYTES NFR BLD: 8 % (ref 5–13)
NEUTS SEG # BLD: 7.6 K/UL (ref 1.8–8)
NEUTS SEG NFR BLD: 73 % (ref 32–75)
NRBC # BLD: 0 K/UL (ref 0–0.01)
NRBC BLD-RTO: 0 PER 100 WBC
PLATELET # BLD AUTO: 283 K/UL (ref 150–400)
PMV BLD AUTO: 8.2 FL (ref 8.9–12.9)
RBC # BLD AUTO: 4.82 M/UL (ref 3.8–5.2)
WBC # BLD AUTO: 10.3 K/UL (ref 3.6–11)

## 2024-10-29 PROCEDURE — 36415 COLL VENOUS BLD VENIPUNCTURE: CPT

## 2024-10-29 PROCEDURE — 85025 COMPLETE CBC W/AUTO DIFF WBC: CPT

## 2024-11-21 ENCOUNTER — HOSPITAL ENCOUNTER (EMERGENCY)
Facility: HOSPITAL | Age: 89
Discharge: HOME | End: 2024-11-21
Attending: EMERGENCY MEDICINE
Payer: MEDICARE

## 2024-11-21 VITALS
DIASTOLIC BLOOD PRESSURE: 69 MMHG | OXYGEN SATURATION: 99 % | RESPIRATION RATE: 17 BRPM | HEIGHT: 64 IN | SYSTOLIC BLOOD PRESSURE: 142 MMHG | BODY MASS INDEX: 32.44 KG/M2 | WEIGHT: 190 LBS | TEMPERATURE: 98.2 F | HEART RATE: 69 BPM

## 2024-11-21 DIAGNOSIS — B37.31 VAGINAL CANDIDIASIS: ICD-10-CM

## 2024-11-21 DIAGNOSIS — N30.00 ACUTE CYSTITIS WITHOUT HEMATURIA: Primary | ICD-10-CM

## 2024-11-21 LAB
APPEARANCE UR: ABNORMAL
BACTERIA URNS QL MICRO: ABNORMAL /HPF
BILIRUB UR QL: NEGATIVE
COLOR UR: ABNORMAL
EPITH CASTS URNS QL MICRO: ABNORMAL /LPF
GLUCOSE UR STRIP.AUTO-MCNC: NEGATIVE MG/DL
HGB UR QL STRIP: ABNORMAL
KETONES UR QL STRIP.AUTO: NEGATIVE MG/DL
LEUKOCYTE ESTERASE UR QL STRIP.AUTO: ABNORMAL
NITRITE UR QL STRIP.AUTO: NEGATIVE
PH UR STRIP: 6 (ref 5–8)
PROT UR STRIP-MCNC: 30 MG/DL
RBC #/AREA URNS HPF: ABNORMAL /HPF (ref 0–5)
SP GR UR REFRACTOMETRY: 1.02 (ref 1–1.03)
URINE CULTURE IF INDICATED: ABNORMAL
UROBILINOGEN UR QL STRIP.AUTO: 0.2 EU/DL (ref 0.2–1)
WBC URNS QL MICRO: >100 /HPF (ref 0–4)

## 2024-11-21 PROCEDURE — 87086 URINE CULTURE/COLONY COUNT: CPT

## 2024-11-21 PROCEDURE — 6370000000 HC RX 637 (ALT 250 FOR IP): Performed by: EMERGENCY MEDICINE

## 2024-11-21 PROCEDURE — 99283 EMERGENCY DEPT VISIT LOW MDM: CPT

## 2024-11-21 PROCEDURE — 81001 URINALYSIS AUTO W/SCOPE: CPT

## 2024-11-21 RX ORDER — CLOTRIMAZOLE AND BETAMETHASONE DIPROPIONATE 10; .64 MG/G; MG/G
CREAM TOPICAL
Qty: 15 G | Refills: 0 | Status: SHIPPED | OUTPATIENT
Start: 2024-11-21

## 2024-11-21 RX ORDER — CEFUROXIME AXETIL 250 MG/1
250 TABLET ORAL 2 TIMES DAILY
Qty: 14 TABLET | Refills: 0 | Status: SHIPPED | OUTPATIENT
Start: 2024-11-21 | End: 2024-11-28

## 2024-11-21 RX ADMIN — CEPHALEXIN 500 MG: 250 CAPSULE ORAL at 11:38

## 2024-11-21 ASSESSMENT — LIFESTYLE VARIABLES
HOW OFTEN DO YOU HAVE A DRINK CONTAINING ALCOHOL: PATIENT DECLINED
HOW MANY STANDARD DRINKS CONTAINING ALCOHOL DO YOU HAVE ON A TYPICAL DAY: PATIENT DECLINED

## 2024-11-21 NOTE — ED NOTES
Daughter Charlene was called at this time and voicemail left stating patient is ready for discharge.

## 2024-11-21 NOTE — ED PROVIDER NOTES
List        START taking these medications      cefUROXime 250 MG tablet  Commonly known as: CEFTIN  Take 1 tablet by mouth 2 times daily for 7 days     clotrimazole-betamethasone 1-0.05 % cream  Commonly known as: LOTRISONE  Apply topically 2 times daily.            ASK your doctor about these medications      acetaminophen 650 MG extended release tablet  Commonly known as: TYLENOL     apixaban 5 MG Tabs tablet  Commonly known as: ELIQUIS     Biotin 2.5 MG Caps     dilTIAZem HCl ER Coated Beads 240 MG Tb24     levothyroxine 100 MCG tablet  Commonly known as: SYNTHROID     meclizine 12.5 MG tablet  Commonly known as: ANTIVERT     mirabegron 25 MG Tb24  Commonly known as: MYRBETRIQ     nystatin 377363 UNIT/GM powder  Commonly known as: MYCOSTATIN               Where to Get Your Medications        These medications were sent to [facility]/pharmacy #[id number] - [place], [place] - [address] [facility] - P [phone] - F [phone]  [address] [facility] [address]      Phone: [phone]   cefUROXime 250 MG tablet  clotrimazole-betamethasone 1-0.05 % cream           DISCONTINUED MEDICATIONS:  Current Discharge Medication List          I am the Primary Clinician of Record.   Francois Pugh DO (electronically signed)    (Please note that parts of this dictation were completed with voice recognition software. Quite often unanticipated grammatical, syntax, homophones, and other interpretive errors are inadvertently transcribed by the computer software. Please disregards these errors. Please excuse any errors that have escaped final proofreading.)          Francois Pugh DO  11/21/24 [id number]

## 2024-11-21 NOTE — ED TRIAGE NOTES
Pt presents via EMS for dysuria x 3 days. Pt states she did not go to her PCP because she was unhappy with their care so she decided to come here via EMS.

## 2024-11-23 LAB
BACTERIA SPEC CULT: NORMAL
CC UR VC: NORMAL
SERVICE CMNT-IMP: NORMAL

## 2025-01-30 ENCOUNTER — HOSPITAL ENCOUNTER (EMERGENCY)
Facility: HOSPITAL | Age: 89
Discharge: HOME | End: 2025-01-30
Attending: EMERGENCY MEDICINE
Payer: MEDICARE

## 2025-01-30 VITALS
DIASTOLIC BLOOD PRESSURE: 70 MMHG | TEMPERATURE: 98.3 F | BODY MASS INDEX: 32.61 KG/M2 | OXYGEN SATURATION: 93 % | RESPIRATION RATE: 18 BRPM | SYSTOLIC BLOOD PRESSURE: 192 MMHG | HEART RATE: 77 BPM | WEIGHT: 190 LBS

## 2025-01-30 DIAGNOSIS — B37.31 VAGINAL CANDIDIASIS: ICD-10-CM

## 2025-01-30 DIAGNOSIS — N30.01 ACUTE CYSTITIS WITH HEMATURIA: Primary | ICD-10-CM

## 2025-01-30 LAB
ALBUMIN SERPL-MCNC: 3.4 G/DL (ref 3.5–5)
ALBUMIN/GLOB SERPL: 0.8 (ref 1.1–2.2)
ALP SERPL-CCNC: 152 U/L (ref 45–117)
ALT SERPL-CCNC: 18 U/L (ref 12–78)
ANION GAP SERPL CALC-SCNC: 10 MMOL/L (ref 2–12)
APPEARANCE UR: CLEAR
AST SERPL-CCNC: 16 U/L (ref 15–37)
BACTERIA URNS QL MICRO: NEGATIVE /HPF
BASOPHILS # BLD: 0.06 K/UL (ref 0–0.1)
BASOPHILS NFR BLD: 0.8 % (ref 0–1)
BILIRUB SERPL-MCNC: 0.3 MG/DL (ref 0.2–1)
BILIRUB UR QL: NEGATIVE
BUN SERPL-MCNC: 21 MG/DL (ref 6–20)
BUN/CREAT SERPL: 15 (ref 12–20)
CALCIUM SERPL-MCNC: 9.5 MG/DL (ref 8.5–10.1)
CHLORIDE SERPL-SCNC: 100 MMOL/L (ref 97–108)
CO2 SERPL-SCNC: 30 MMOL/L (ref 21–32)
COLOR UR: ABNORMAL
CREAT SERPL-MCNC: 1.39 MG/DL (ref 0.55–1.02)
DIFFERENTIAL METHOD BLD: ABNORMAL
EOSINOPHIL # BLD: 0.1 K/UL (ref 0–0.4)
EOSINOPHIL NFR BLD: 1.4 % (ref 0–0.7)
EPITH CASTS URNS QL MICRO: ABNORMAL /LPF
ERYTHROCYTE [DISTWIDTH] IN BLOOD BY AUTOMATED COUNT: 14.5 % (ref 11.5–14.5)
GLOBULIN SER CALC-MCNC: 4.3 G/DL (ref 2–4)
GLUCOSE SERPL-MCNC: 105 MG/DL (ref 65–100)
GLUCOSE UR STRIP.AUTO-MCNC: NEGATIVE MG/DL
HCT VFR BLD AUTO: 44.2 % (ref 35–47)
HGB BLD-MCNC: 13.8 G/DL (ref 11.5–16)
HGB UR QL STRIP: ABNORMAL
IMM GRANULOCYTES # BLD AUTO: 0.02 K/UL (ref 0–0.04)
IMM GRANULOCYTES NFR BLD AUTO: 0.3 % (ref 0–0.5)
KETONES UR QL STRIP.AUTO: NEGATIVE MG/DL
LEUKOCYTE ESTERASE UR QL STRIP.AUTO: ABNORMAL
LYMPHOCYTES # BLD: 1.8 K/UL (ref 0.8–3.5)
LYMPHOCYTES NFR BLD: 24.5 % (ref 12–49)
MCH RBC QN AUTO: 28.5 PG (ref 26–34)
MCHC RBC AUTO-ENTMCNC: 31.2 G/DL (ref 30–36.5)
MCV RBC AUTO: 91.1 FL (ref 80–99)
MONOCYTES # BLD: 0.6 K/UL (ref 0–1)
MONOCYTES NFR BLD: 8.2 % (ref 5–13)
NEUTS SEG # BLD: 4.76 K/UL (ref 1.8–8)
NEUTS SEG NFR BLD: 64.8 % (ref 32–75)
NITRITE UR QL STRIP.AUTO: NEGATIVE
NRBC # BLD: 0 K/UL (ref 0–0.01)
NRBC BLD-RTO: 0 PER 100 WBC
PH UR STRIP: 6 (ref 5–8)
PLATELET # BLD AUTO: 236 K/UL (ref 150–400)
PMV BLD AUTO: 8.4 FL (ref 8.9–12.9)
POTASSIUM SERPL-SCNC: 4 MMOL/L (ref 3.5–5.1)
PROT SERPL-MCNC: 7.7 G/DL (ref 6.4–8.2)
PROT UR STRIP-MCNC: ABNORMAL MG/DL
RBC # BLD AUTO: 4.85 M/UL (ref 3.8–5.2)
RBC #/AREA URNS HPF: ABNORMAL /HPF (ref 0–5)
SODIUM SERPL-SCNC: 140 MMOL/L (ref 136–145)
SP GR UR REFRACTOMETRY: 1.02 (ref 1–1.03)
URINE CULTURE IF INDICATED: ABNORMAL
UROBILINOGEN UR QL STRIP.AUTO: 0.2 EU/DL (ref 0.2–1)
WBC # BLD AUTO: 7.3 K/UL (ref 3.6–11)
WBC URNS QL MICRO: >100 /HPF (ref 0–4)

## 2025-01-30 PROCEDURE — 85025 COMPLETE CBC W/AUTO DIFF WBC: CPT

## 2025-01-30 PROCEDURE — 81001 URINALYSIS AUTO W/SCOPE: CPT

## 2025-01-30 PROCEDURE — 80053 COMPREHEN METABOLIC PANEL: CPT

## 2025-01-30 PROCEDURE — 87086 URINE CULTURE/COLONY COUNT: CPT

## 2025-01-30 PROCEDURE — 99283 EMERGENCY DEPT VISIT LOW MDM: CPT

## 2025-01-30 PROCEDURE — 36415 COLL VENOUS BLD VENIPUNCTURE: CPT

## 2025-01-30 RX ORDER — CEFDINIR 300 MG/1
300 CAPSULE ORAL 2 TIMES DAILY
Qty: 14 CAPSULE | Refills: 0 | Status: SHIPPED | OUTPATIENT
Start: 2025-01-30 | End: 2025-02-06

## 2025-01-30 RX ORDER — CLOTRIMAZOLE 1 %
CREAM WITH APPLICATOR VAGINAL
Qty: 45 G | Refills: 0 | Status: SHIPPED | OUTPATIENT
Start: 2025-01-30 | End: 2025-02-06

## 2025-01-30 RX ORDER — FLUCONAZOLE 150 MG/1
150 TABLET ORAL ONCE
Qty: 1 TABLET | Refills: 0 | Status: SHIPPED | OUTPATIENT
Start: 2025-02-07 | End: 2025-02-07

## 2025-01-30 RX ORDER — NYSTATIN 100000 [USP'U]/G
POWDER TOPICAL
Qty: 30 G | Refills: 0 | Status: SHIPPED | OUTPATIENT
Start: 2025-01-30

## 2025-01-30 ASSESSMENT — LIFESTYLE VARIABLES
HOW MANY STANDARD DRINKS CONTAINING ALCOHOL DO YOU HAVE ON A TYPICAL DAY: PATIENT DOES NOT DRINK
HOW OFTEN DO YOU HAVE A DRINK CONTAINING ALCOHOL: NEVER

## 2025-01-30 ASSESSMENT — PAIN SCALES - GENERAL
PAINLEVEL_OUTOF10: 0
PAINLEVEL_OUTOF10: 0

## 2025-01-30 ASSESSMENT — PAIN - FUNCTIONAL ASSESSMENT: PAIN_FUNCTIONAL_ASSESSMENT: 0-10

## 2025-01-30 NOTE — ED PROVIDER NOTES
(!) 192/70   Pulse:       Resp:       Temp:       SpO2: 97% 97% 94% 93%   Weight:            Patient was given the following medications:  Medications - No data to display    Medical Decision Making  Patient evaluated found to be in no acute cardiopulmonary distress, most likely hemorrhagic cystitis, will check labs including CBC, CMP, urinalysis.  I do not think imaging is indicated at this time based off the history and physical exam, most likely hemorrhagic cystitis.    Amount and/or Complexity of Data Reviewed  Labs: ordered.     Details: Urine is consistent with infection    Risk  Prescription drug management.          CLINICAL MANAGEMENT TOOLS:  Not Applicable                 FINAL IMPRESSION     1. Acute cystitis with hematuria    2. Vaginal candidiasis          DISPOSITION/PLAN   Yun Weiss's  results have been reviewed with her.  She has been counseled regarding her diagnosis, treatment, and plan.  She verbally conveys understanding and agreement of the signs, symptoms, diagnosis, treatment and prognosis and additionally agrees to follow up as discussed.  She also agrees with the care-plan and conveys that all of her questions have been answered.  I have also provided discharge instructions for her that include: educational information regarding their diagnosis and treatment, and list of reasons why they would want to return to the ED prior to their follow-up appointment, should her condition change.     CLINICAL IMPRESSION    Discharge Note: The patient is stable for discharge home. The signs, symptoms, diagnosis, and discharge instructions have been discussed, understanding conveyed, and agreed upon. The patient is to follow up as recommended or return to ER should their symptoms worsen.      PATIENT REFERRED TO:  Sandra West APRN - NP  [address] [facility] [address] [phone]    Schedule an appointment as soon as possible for a visit       [facility]

## 2025-01-30 NOTE — DISCHARGE INSTRUCTIONS
you follow up with a doctor, nurse practitioner, or physician assistant for ongoing care. If your symptoms become worse or you do not improve as expected and you are unable to reach your usual health care provider, you should return to the Emergency Department. We are available 24 hours a day.    Please take your discharge instructions with you when you go to your follow-up appointment.     If a prescription has been provided, please have it filled as soon as possible to prevent a delay in treatment. Read the entire medication instruction sheet provided to you by the pharmacy. If you have any questions or reservations about taking the medication due to side effects or interactions with other medications, please call your primary care physician or contact the ER to speak with the charge nurse.     Please make an appointment with your family doctor or the physician you were referred to for follow-up of this visit as instructed on your discharge paperwork. Return to the ER if you are unable to be seen or if you are unable to be seen in a timely manner.    Should you experience abdominal pain lasting greater than 6 hours, chest pain, difficulty breathing, fever/chills, numbness/tingling, skin changes or other symptoms that concern you, return to the ED sooner. If you feel worse over the next 24 hours, please return to the ED. We are available 24 hours a day. Thank you for trusting us with your care!

## 2025-02-01 LAB
BACTERIA SPEC CULT: NORMAL
CC UR VC: NORMAL
SERVICE CMNT-IMP: NORMAL

## 2025-02-11 ENCOUNTER — APPOINTMENT (OUTPATIENT)
Facility: HOSPITAL | Age: 89
End: 2025-02-11
Payer: MEDICARE

## 2025-02-11 ENCOUNTER — HOSPITAL ENCOUNTER (EMERGENCY)
Facility: HOSPITAL | Age: 89
Discharge: HOME | End: 2025-02-12
Attending: EMERGENCY MEDICINE
Payer: MEDICARE

## 2025-02-11 VITALS
HEART RATE: 78 BPM | OXYGEN SATURATION: 96 % | WEIGHT: 193 LBS | SYSTOLIC BLOOD PRESSURE: 116 MMHG | DIASTOLIC BLOOD PRESSURE: 54 MMHG | TEMPERATURE: 99.7 F | RESPIRATION RATE: 20 BRPM | BODY MASS INDEX: 33.13 KG/M2

## 2025-02-11 DIAGNOSIS — N30.00 ACUTE CYSTITIS WITHOUT HEMATURIA: ICD-10-CM

## 2025-02-11 DIAGNOSIS — R50.9 FEBRILE ILLNESS, ACUTE: Primary | ICD-10-CM

## 2025-02-11 LAB
ALBUMIN SERPL-MCNC: 3.5 G/DL (ref 3.5–5)
ALBUMIN/GLOB SERPL: 0.9 (ref 1.1–2.2)
ALP SERPL-CCNC: 181 U/L (ref 45–117)
ALT SERPL-CCNC: 32 U/L (ref 12–78)
ANION GAP SERPL CALC-SCNC: 10 MMOL/L (ref 2–12)
APPEARANCE UR: CLEAR
AST SERPL-CCNC: 39 U/L (ref 15–37)
BACTERIA URNS QL MICRO: ABNORMAL /HPF
BASOPHILS # BLD: 0.04 K/UL (ref 0–0.1)
BASOPHILS NFR BLD: 0.4 % (ref 0–1)
BILIRUB SERPL-MCNC: 0.3 MG/DL (ref 0.2–1)
BILIRUB UR QL: NEGATIVE
BUN SERPL-MCNC: 22 MG/DL (ref 6–20)
BUN/CREAT SERPL: 14 (ref 12–20)
CALCIUM SERPL-MCNC: 9.2 MG/DL (ref 8.5–10.1)
CAOX CRY URNS QL MICRO: ABNORMAL
CHLORIDE SERPL-SCNC: 104 MMOL/L (ref 97–108)
CO2 SERPL-SCNC: 27 MMOL/L (ref 21–32)
COLOR UR: ABNORMAL
CREAT SERPL-MCNC: 1.56 MG/DL (ref 0.55–1.02)
DIFFERENTIAL METHOD BLD: ABNORMAL
EOSINOPHIL # BLD: 0.04 K/UL (ref 0–0.4)
EOSINOPHIL NFR BLD: 0.4 % (ref 0–0.7)
EPITH CASTS URNS QL MICRO: ABNORMAL /LPF
ERYTHROCYTE [DISTWIDTH] IN BLOOD BY AUTOMATED COUNT: 14.7 % (ref 11.5–14.5)
FLUAV RNA SPEC QL NAA+PROBE: NOT DETECTED
FLUBV RNA SPEC QL NAA+PROBE: NOT DETECTED
GLOBULIN SER CALC-MCNC: 4 G/DL (ref 2–4)
GLUCOSE SERPL-MCNC: 124 MG/DL (ref 65–100)
GLUCOSE UR STRIP.AUTO-MCNC: NEGATIVE MG/DL
HCT VFR BLD AUTO: 43 % (ref 35–47)
HGB BLD-MCNC: 13.4 G/DL (ref 11.5–16)
HGB UR QL STRIP: ABNORMAL
IMM GRANULOCYTES # BLD AUTO: 0.05 K/UL (ref 0–0.04)
IMM GRANULOCYTES NFR BLD AUTO: 0.5 % (ref 0–0.5)
KETONES UR QL STRIP.AUTO: NEGATIVE MG/DL
LACTATE SERPL-SCNC: 1.4 MMOL/L (ref 0.4–2)
LEUKOCYTE ESTERASE UR QL STRIP.AUTO: ABNORMAL
LYMPHOCYTES # BLD: 1 K/UL (ref 0.8–3.5)
LYMPHOCYTES NFR BLD: 9.9 % (ref 12–49)
MCH RBC QN AUTO: 28.3 PG (ref 26–34)
MCHC RBC AUTO-ENTMCNC: 31.2 G/DL (ref 30–36.5)
MCV RBC AUTO: 90.9 FL (ref 80–99)
MONOCYTES # BLD: 0.61 K/UL (ref 0–1)
MONOCYTES NFR BLD: 6 % (ref 5–13)
NEUTS SEG # BLD: 8.39 K/UL (ref 1.8–8)
NEUTS SEG NFR BLD: 82.8 % (ref 32–75)
NITRITE UR QL STRIP.AUTO: NEGATIVE
NRBC # BLD: 0 K/UL (ref 0–0.01)
NRBC BLD-RTO: 0 PER 100 WBC
PH UR STRIP: 7.5 (ref 5–8)
PLATELET # BLD AUTO: 266 K/UL (ref 150–400)
PMV BLD AUTO: 8.4 FL (ref 8.9–12.9)
POTASSIUM SERPL-SCNC: 4.6 MMOL/L (ref 3.5–5.1)
PROT SERPL-MCNC: 7.5 G/DL (ref 6.4–8.2)
PROT UR STRIP-MCNC: ABNORMAL MG/DL
RBC # BLD AUTO: 4.73 M/UL (ref 3.8–5.2)
RBC #/AREA URNS HPF: ABNORMAL /HPF (ref 0–5)
SARS-COV-2 RNA RESP QL NAA+PROBE: NOT DETECTED
SODIUM SERPL-SCNC: 141 MMOL/L (ref 136–145)
SOURCE: NORMAL
SP GR UR REFRACTOMETRY: 1.01 (ref 1–1.03)
TROPONIN I SERPL HS-MCNC: 9 NG/L (ref 0–51)
URINE CULTURE IF INDICATED: ABNORMAL
UROBILINOGEN UR QL STRIP.AUTO: 0.2 EU/DL (ref 0.2–1)
WBC # BLD AUTO: 10.1 K/UL (ref 3.6–11)
WBC URNS QL MICRO: >100 /HPF (ref 0–4)

## 2025-02-11 PROCEDURE — 99285 EMERGENCY DEPT VISIT HI MDM: CPT

## 2025-02-11 PROCEDURE — 87040 BLOOD CULTURE FOR BACTERIA: CPT

## 2025-02-11 PROCEDURE — 36415 COLL VENOUS BLD VENIPUNCTURE: CPT

## 2025-02-11 PROCEDURE — 85025 COMPLETE CBC W/AUTO DIFF WBC: CPT

## 2025-02-11 PROCEDURE — 87086 URINE CULTURE/COLONY COUNT: CPT

## 2025-02-11 PROCEDURE — 6360000002 HC RX W HCPCS: Performed by: FAMILY MEDICINE

## 2025-02-11 PROCEDURE — 80053 COMPREHEN METABOLIC PANEL: CPT

## 2025-02-11 PROCEDURE — 6370000000 HC RX 637 (ALT 250 FOR IP): Performed by: FAMILY MEDICINE

## 2025-02-11 PROCEDURE — 87636 SARSCOV2 & INF A&B AMP PRB: CPT

## 2025-02-11 PROCEDURE — 83605 ASSAY OF LACTIC ACID: CPT

## 2025-02-11 PROCEDURE — 84145 PROCALCITONIN (PCT): CPT

## 2025-02-11 PROCEDURE — 2500000003 HC RX 250 WO HCPCS: Performed by: FAMILY MEDICINE

## 2025-02-11 PROCEDURE — 73562 X-RAY EXAM OF KNEE 3: CPT

## 2025-02-11 PROCEDURE — 81001 URINALYSIS AUTO W/SCOPE: CPT

## 2025-02-11 PROCEDURE — 93005 ELECTROCARDIOGRAM TRACING: CPT | Performed by: EMERGENCY MEDICINE

## 2025-02-11 PROCEDURE — 71045 X-RAY EXAM CHEST 1 VIEW: CPT

## 2025-02-11 PROCEDURE — 2580000003 HC RX 258: Performed by: EMERGENCY MEDICINE

## 2025-02-11 PROCEDURE — 84484 ASSAY OF TROPONIN QUANT: CPT

## 2025-02-11 PROCEDURE — 96374 THER/PROPH/DIAG INJ IV PUSH: CPT

## 2025-02-11 RX ORDER — CEFDINIR 300 MG/1
300 CAPSULE ORAL DAILY
Qty: 7 CAPSULE | Refills: 0 | Status: SHIPPED | OUTPATIENT
Start: 2025-02-11 | End: 2025-02-18

## 2025-02-11 RX ORDER — 0.9 % SODIUM CHLORIDE 0.9 %
30 INTRAVENOUS SOLUTION INTRAVENOUS ONCE
Status: COMPLETED | OUTPATIENT
Start: 2025-02-11 | End: 2025-02-11

## 2025-02-11 RX ORDER — ACETAMINOPHEN 500 MG
1000 TABLET ORAL
Status: COMPLETED | OUTPATIENT
Start: 2025-02-11 | End: 2025-02-11

## 2025-02-11 RX ADMIN — ACETAMINOPHEN 1000 MG: 500 TABLET ORAL at 20:11

## 2025-02-11 RX ADMIN — WATER 1000 MG: 1 INJECTION INTRAMUSCULAR; INTRAVENOUS; SUBCUTANEOUS at 23:39

## 2025-02-11 RX ADMIN — SODIUM CHLORIDE 1000 ML: 9 INJECTION, SOLUTION INTRAVENOUS at 20:02

## 2025-02-11 RX ADMIN — SODIUM CHLORIDE 2625 ML: 9 INJECTION, SOLUTION INTRAVENOUS at 21:15

## 2025-02-11 ASSESSMENT — PAIN SCALES - GENERAL: PAINLEVEL_OUTOF10: 0

## 2025-02-11 ASSESSMENT — PAIN - FUNCTIONAL ASSESSMENT: PAIN_FUNCTIONAL_ASSESSMENT: NONE - DENIES PAIN

## 2025-02-11 ASSESSMENT — LIFESTYLE VARIABLES
HOW OFTEN DO YOU HAVE A DRINK CONTAINING ALCOHOL: NEVER
HOW MANY STANDARD DRINKS CONTAINING ALCOHOL DO YOU HAVE ON A TYPICAL DAY: PATIENT DOES NOT DRINK

## 2025-02-11 NOTE — ED TRIAGE NOTES
C/o headache and weakness x 2 days..found to be febrile durng triage with oral temp of 101.5 , currently on day 2 of atbx for UTI . Axox4

## 2025-02-11 NOTE — ED PROVIDER NOTES
[facility] EMERGENCY DEPARTMENT  EMERGENCY DEPARTMENT ENCOUNTER       Pt Name: Yun Weiss  MRN: [id number]  Birthdate 1935  Date of evaluation: 2/11/2025  Provider: Irving Sosa DO   PCP: Sandra West APRN - NP  Note Started: 6:03 PM EST 2/11/25     CHIEF COMPLAINT       Chief Complaint   Patient presents with    Malaise        HISTORY OF PRESENT ILLNESS: 1 or more elements      History From: Patient, History limited by: none     Yun Weiss is a 89 y.o. female  past medical history significant for recurrent UTIs presenting the emergency department complaining of fever, general malaise, fatigue.  Does report nonproductive cough.  On Bactrim currently for UTI started 2 days ago.  She is also complaining of right knee pain.       Please See MDM for Additional Details of the HPI/PMH  Nursing Notes were all reviewed and agreed with or any disagreements were addressed in the HPI.     REVIEW OF SYSTEMS        Positives and Pertinent negatives as per HPI.    PAST HISTORY     Past Medical History:  Past Medical History:   Diagnosis Date    Arthritis     Ill-defined condition     vertigo    Thromboembolus (HCC)     Urinary bladder incontinence        Past Surgical History:  Past Surgical History:   Procedure Laterality Date    GYN      hysterectomy       Family History:  History reviewed. No pertinent family history.    Social History:  Social History     Tobacco Use    Smoking status: Never    Smokeless tobacco: Never   Substance Use Topics    Alcohol use: Never    Drug use: Never       Allergies:  Allergies   Allergen Reactions    Doxycycline Nausea And Vomiting       CURRENT MEDICATIONS      Discharge Medication List as of 2/11/2025 11:43 PM        CONTINUE these medications which have NOT CHANGED    Details   !! nystatin (MYCOSTATIN) 132719 UNIT/GM powder Apply topically 4 times daily to groin, Disp-30 g, R-0, Normal      clotrimazole-betamethasone (LOTRISONE) 1-0.05 % cream Apply topically

## 2025-02-12 LAB
EKG ATRIAL RATE: 81 BPM
EKG DIAGNOSIS: NORMAL
EKG P AXIS: -1 DEGREES
EKG P-R INTERVAL: 226 MS
EKG Q-T INTERVAL: 368 MS
EKG QRS DURATION: 112 MS
EKG QTC CALCULATION (BAZETT): 427 MS
EKG R AXIS: -85 DEGREES
EKG T AXIS: 76 DEGREES
EKG VENTRICULAR RATE: 81 BPM
PROCALCITONIN SERPL-MCNC: 0.1 NG/ML

## 2025-02-12 NOTE — ED NOTES
Pt's daughter called asking about whether or not we will be keeping pt overnight. This nurse stated that a final decision has not been made but that I would call once we knew more information. Pt's daughter verbalized understanding.

## 2025-02-12 NOTE — DISCHARGE INSTRUCTIONS
--Cefdinir 300 mg daily for the next 7 days.  --Follow up with [place] this week if fevers not resolving.  --Stop the TMP/SMZ.

## 2025-02-13 LAB
BACTERIA SPEC CULT: NORMAL
CC UR VC: NORMAL
SERVICE CMNT-IMP: NORMAL

## 2025-03-25 NOTE — DISCHARGE INSTRUCTIONS
Increase Lasix to 40 mg a day for the next 3 days and then back to 20 mg a day. Record daily weights. Return for increased swelling fever spreading redness or uncontrolled pain. Follow-up daily weights and response to medications to Dr. Maricarmen Duron within the next 3 to 4 days. 4 = No assist / stand by assistance